# Patient Record
Sex: MALE | Race: WHITE | NOT HISPANIC OR LATINO | Employment: FULL TIME | URBAN - METROPOLITAN AREA
[De-identification: names, ages, dates, MRNs, and addresses within clinical notes are randomized per-mention and may not be internally consistent; named-entity substitution may affect disease eponyms.]

---

## 2017-04-03 ENCOUNTER — APPOINTMENT (OUTPATIENT)
Dept: LAB | Facility: CLINIC | Age: 62
End: 2017-04-03
Payer: COMMERCIAL

## 2017-04-03 ENCOUNTER — TRANSCRIBE ORDERS (OUTPATIENT)
Dept: LAB | Facility: CLINIC | Age: 62
End: 2017-04-03

## 2017-04-03 DIAGNOSIS — Z79.899 ENCOUNTER FOR LONG-TERM (CURRENT) USE OF OTHER MEDICATIONS: ICD-10-CM

## 2017-04-03 DIAGNOSIS — N40.0 BENIGN NON-NODULAR PROSTATIC HYPERPLASIA WITHOUT LOWER URINARY TRACT SYMPTOMS: Primary | ICD-10-CM

## 2017-04-03 DIAGNOSIS — E78.5 OTHER AND UNSPECIFIED HYPERLIPIDEMIA: ICD-10-CM

## 2017-04-03 DIAGNOSIS — E83.110 HEREDITARY HEMOCHROMATOSIS (HCC): ICD-10-CM

## 2017-04-03 LAB
ALBUMIN SERPL BCP-MCNC: 4 G/DL (ref 3.5–5)
ALP SERPL-CCNC: 48 U/L (ref 46–116)
ALT SERPL W P-5'-P-CCNC: 24 U/L (ref 12–78)
ANION GAP SERPL CALCULATED.3IONS-SCNC: 6 MMOL/L (ref 4–13)
AST SERPL W P-5'-P-CCNC: 14 U/L (ref 5–45)
BASOPHILS # BLD AUTO: 0.01 THOUSANDS/ΜL (ref 0–0.1)
BASOPHILS NFR BLD AUTO: 0 % (ref 0–1)
BILIRUB DIRECT SERPL-MCNC: 0.21 MG/DL (ref 0–0.2)
BILIRUB SERPL-MCNC: 0.79 MG/DL (ref 0.2–1)
BUN SERPL-MCNC: 23 MG/DL (ref 5–25)
CALCIUM SERPL-MCNC: 9 MG/DL (ref 8.3–10.1)
CHLORIDE SERPL-SCNC: 106 MMOL/L (ref 100–108)
CHOLEST SERPL-MCNC: 150 MG/DL (ref 50–200)
CK SERPL-CCNC: 105 U/L (ref 39–308)
CO2 SERPL-SCNC: 29 MMOL/L (ref 21–32)
CREAT SERPL-MCNC: 1.06 MG/DL (ref 0.6–1.3)
EOSINOPHIL # BLD AUTO: 0.07 THOUSAND/ΜL (ref 0–0.61)
EOSINOPHIL NFR BLD AUTO: 1 % (ref 0–6)
ERYTHROCYTE [DISTWIDTH] IN BLOOD BY AUTOMATED COUNT: 13.6 % (ref 11.6–15.1)
FERRITIN SERPL-MCNC: 88 NG/ML (ref 8–388)
GFR SERPL CREATININE-BSD FRML MDRD: >60 ML/MIN/1.73SQ M
GLUCOSE P FAST SERPL-MCNC: 101 MG/DL (ref 65–99)
HCT VFR BLD AUTO: 45.8 % (ref 36.5–49.3)
HDLC SERPL-MCNC: 56 MG/DL (ref 40–60)
HGB BLD-MCNC: 15.9 G/DL (ref 12–17)
LDLC SERPL CALC-MCNC: 79 MG/DL (ref 0–100)
LYMPHOCYTES # BLD AUTO: 1.36 THOUSANDS/ΜL (ref 0.6–4.47)
LYMPHOCYTES NFR BLD AUTO: 23 % (ref 14–44)
MCH RBC QN AUTO: 31.2 PG (ref 26.8–34.3)
MCHC RBC AUTO-ENTMCNC: 34.7 G/DL (ref 31.4–37.4)
MCV RBC AUTO: 90 FL (ref 82–98)
MONOCYTES # BLD AUTO: 0.4 THOUSAND/ΜL (ref 0.17–1.22)
MONOCYTES NFR BLD AUTO: 7 % (ref 4–12)
NEUTROPHILS # BLD AUTO: 4 THOUSANDS/ΜL (ref 1.85–7.62)
NEUTS SEG NFR BLD AUTO: 69 % (ref 43–75)
NRBC BLD AUTO-RTO: 0 /100 WBCS
PLATELET # BLD AUTO: 199 THOUSANDS/UL (ref 149–390)
PMV BLD AUTO: 10.7 FL (ref 8.9–12.7)
POTASSIUM SERPL-SCNC: 4.4 MMOL/L (ref 3.5–5.3)
PROT SERPL-MCNC: 7.2 G/DL (ref 6.4–8.2)
RBC # BLD AUTO: 5.09 MILLION/UL (ref 3.88–5.62)
SODIUM SERPL-SCNC: 141 MMOL/L (ref 136–145)
TRIGL SERPL-MCNC: 74 MG/DL
WBC # BLD AUTO: 5.86 THOUSAND/UL (ref 4.31–10.16)

## 2017-04-03 PROCEDURE — 85025 COMPLETE CBC W/AUTO DIFF WBC: CPT

## 2017-04-03 PROCEDURE — 82248 BILIRUBIN DIRECT: CPT

## 2017-04-03 PROCEDURE — 82107 ALPHA-FETOPROTEIN L3: CPT

## 2017-04-03 PROCEDURE — 84153 ASSAY OF PSA TOTAL: CPT

## 2017-04-03 PROCEDURE — 82728 ASSAY OF FERRITIN: CPT

## 2017-04-03 PROCEDURE — 82550 ASSAY OF CK (CPK): CPT

## 2017-04-03 PROCEDURE — 80061 LIPID PANEL: CPT

## 2017-04-03 PROCEDURE — 36415 COLL VENOUS BLD VENIPUNCTURE: CPT

## 2017-04-03 PROCEDURE — 80053 COMPREHEN METABOLIC PANEL: CPT

## 2017-04-03 PROCEDURE — 84154 ASSAY OF PSA FREE: CPT

## 2017-04-04 LAB
AFP L3 MFR SERPL: NORMAL % (ref 0–9.9)
AFP SERPL-MCNC: 1.1 NG/ML (ref 0–8)
PSA FREE MFR SERPL: 29.4 %
PSA FREE SERPL-MCNC: 0.5 NG/ML
PSA SERPL-MCNC: 1.7 NG/ML (ref 0–4)

## 2017-05-01 ENCOUNTER — ALLSCRIPTS OFFICE VISIT (OUTPATIENT)
Dept: OTHER | Facility: OTHER | Age: 62
End: 2017-05-01

## 2017-05-01 ENCOUNTER — HOSPITAL ENCOUNTER (OUTPATIENT)
Dept: ULTRASOUND IMAGING | Facility: HOSPITAL | Age: 62
Discharge: HOME | End: 2017-05-01
Attending: INTERNAL MEDICINE
Payer: COMMERCIAL

## 2017-05-01 DIAGNOSIS — E83.110 HEREDITARY HEMOCHROMATOSIS (HCC): ICD-10-CM

## 2017-05-01 PROCEDURE — 76700 US EXAM ABDOM COMPLETE: CPT

## 2017-05-04 ENCOUNTER — ALLSCRIPTS OFFICE VISIT (OUTPATIENT)
Dept: OTHER | Facility: OTHER | Age: 62
End: 2017-05-04

## 2017-05-05 ENCOUNTER — HOSPITAL ENCOUNTER (OUTPATIENT)
Dept: INFUSION CENTER | Facility: HOSPITAL | Age: 62
Discharge: HOME | End: 2017-05-05
Payer: COMMERCIAL

## 2017-05-05 VITALS
HEART RATE: 57 BPM | SYSTOLIC BLOOD PRESSURE: 146 MMHG | TEMPERATURE: 98.5 F | RESPIRATION RATE: 18 BRPM | DIASTOLIC BLOOD PRESSURE: 72 MMHG

## 2017-05-05 PROCEDURE — 99195 PHLEBOTOMY: CPT

## 2017-05-05 RX ADMIN — SODIUM CHLORIDE 250 ML: 0.9 INJECTION, SOLUTION INTRAVENOUS at 14:34

## 2017-05-05 NOTE — PLAN OF CARE
Problem: Potential for Falls  Goal: Patient will remain free of falls  INTERVENTIONS:  - Assess patient frequently for physical needs  - Identify cognitive and physical deficits and behaviors that affect risk of falls    - [place] fall precautions as indicated by assessment   - Educate patient/family on patient safety including physical limitations  - Instruct patient to call for assistance with activity based on assessment  - Modify environment to reduce risk of injury  - Consider OT/PT consult to assist with strengthening/mobility   Outcome: Progressing

## 2017-05-05 NOTE — PLAN OF CARE
Problem: Potential for Falls  Goal: Patient will remain free of falls  INTERVENTIONS:  - Assess patient frequently for physical needs  - Identify cognitive and physical deficits and behaviors that affect risk of falls    - [place] fall precautions as indicated by assessment   - Educate patient/family on patient safety including physical limitations  - Instruct patient to call for assistance with activity based on assessment  - Modify environment to reduce risk of injury  - Consider OT/PT consult to assist with strengthening/mobility   Outcome: Progressing

## 2017-05-05 NOTE — PROGRESS NOTES
Pt started phleb at 1505 and completed at 1552  Collected full 450ml phleb  Pt offered water pre and post and GAURI garcia for d/c when finished

## 2017-09-29 ENCOUNTER — GENERIC CONVERSION - ENCOUNTER (OUTPATIENT)
Dept: OTHER | Facility: OTHER | Age: 62
End: 2017-09-29

## 2017-09-29 ENCOUNTER — APPOINTMENT (OUTPATIENT)
Dept: LAB | Facility: CLINIC | Age: 62
End: 2017-09-29
Payer: COMMERCIAL

## 2017-09-29 DIAGNOSIS — R73.01 IMPAIRED FASTING GLUCOSE: ICD-10-CM

## 2017-09-29 DIAGNOSIS — Z23 ENCOUNTER FOR IMMUNIZATION: ICD-10-CM

## 2017-09-29 LAB
EST. AVERAGE GLUCOSE BLD GHB EST-MCNC: 108 MG/DL
GLUCOSE P FAST SERPL-MCNC: 100 MG/DL (ref 65–99)
HBA1C MFR BLD: 5.4 % (ref 4.2–6.3)

## 2017-09-29 PROCEDURE — 82947 ASSAY GLUCOSE BLOOD QUANT: CPT

## 2017-09-29 PROCEDURE — 83036 HEMOGLOBIN GLYCOSYLATED A1C: CPT

## 2017-09-29 PROCEDURE — 36415 COLL VENOUS BLD VENIPUNCTURE: CPT

## 2017-10-23 DIAGNOSIS — E83.110 HEREDITARY HEMOCHROMATOSIS (HCC): ICD-10-CM

## 2017-10-27 ENCOUNTER — APPOINTMENT (OUTPATIENT)
Dept: LAB | Facility: CLINIC | Age: 62
End: 2017-10-27
Payer: COMMERCIAL

## 2017-10-27 DIAGNOSIS — E83.110 HEREDITARY HEMOCHROMATOSIS (HCC): ICD-10-CM

## 2017-10-27 LAB
ALBUMIN SERPL BCP-MCNC: 3.5 G/DL (ref 3.5–5)
ALP SERPL-CCNC: 59 U/L (ref 46–116)
ALT SERPL W P-5'-P-CCNC: 17 U/L (ref 12–78)
ANION GAP SERPL CALCULATED.3IONS-SCNC: 7 MMOL/L (ref 4–13)
AST SERPL W P-5'-P-CCNC: 14 U/L (ref 5–45)
BASOPHILS # BLD AUTO: 0.03 THOUSANDS/ΜL (ref 0–0.1)
BASOPHILS NFR BLD AUTO: 0 % (ref 0–1)
BILIRUB SERPL-MCNC: 0.7 MG/DL (ref 0.2–1)
BUN SERPL-MCNC: 20 MG/DL (ref 5–25)
CALCIUM SERPL-MCNC: 8.8 MG/DL (ref 8.3–10.1)
CHLORIDE SERPL-SCNC: 109 MMOL/L (ref 100–108)
CO2 SERPL-SCNC: 26 MMOL/L (ref 21–32)
CREAT SERPL-MCNC: 1.03 MG/DL (ref 0.6–1.3)
EOSINOPHIL # BLD AUTO: 0.06 THOUSAND/ΜL (ref 0–0.61)
EOSINOPHIL NFR BLD AUTO: 1 % (ref 0–6)
ERYTHROCYTE [DISTWIDTH] IN BLOOD BY AUTOMATED COUNT: 13.9 % (ref 11.6–15.1)
FERRITIN SERPL-MCNC: 108 NG/ML (ref 8–388)
GFR SERPL CREATININE-BSD FRML MDRD: 76 ML/MIN/1.73SQ M
GLUCOSE P FAST SERPL-MCNC: 105 MG/DL (ref 65–99)
HCT VFR BLD AUTO: 42.7 % (ref 36.5–49.3)
HGB BLD-MCNC: 15.1 G/DL (ref 12–17)
LYMPHOCYTES # BLD AUTO: 1.43 THOUSANDS/ΜL (ref 0.6–4.47)
LYMPHOCYTES NFR BLD AUTO: 20 % (ref 14–44)
MCH RBC QN AUTO: 31.5 PG (ref 26.8–34.3)
MCHC RBC AUTO-ENTMCNC: 35.4 G/DL (ref 31.4–37.4)
MCV RBC AUTO: 89 FL (ref 82–98)
MONOCYTES # BLD AUTO: 0.6 THOUSAND/ΜL (ref 0.17–1.22)
MONOCYTES NFR BLD AUTO: 8 % (ref 4–12)
NEUTROPHILS # BLD AUTO: 4.96 THOUSANDS/ΜL (ref 1.85–7.62)
NEUTS SEG NFR BLD AUTO: 71 % (ref 43–75)
NRBC BLD AUTO-RTO: 0 /100 WBCS
PLATELET # BLD AUTO: 292 THOUSANDS/UL (ref 149–390)
PMV BLD AUTO: 10.5 FL (ref 8.9–12.7)
POTASSIUM SERPL-SCNC: 3.8 MMOL/L (ref 3.5–5.3)
PROT SERPL-MCNC: 7.2 G/DL (ref 6.4–8.2)
RBC # BLD AUTO: 4.79 MILLION/UL (ref 3.88–5.62)
SODIUM SERPL-SCNC: 142 MMOL/L (ref 136–145)
WBC # BLD AUTO: 7.11 THOUSAND/UL (ref 4.31–10.16)

## 2017-10-27 PROCEDURE — 82728 ASSAY OF FERRITIN: CPT

## 2017-10-27 PROCEDURE — 36415 COLL VENOUS BLD VENIPUNCTURE: CPT

## 2017-10-27 PROCEDURE — 85025 COMPLETE CBC W/AUTO DIFF WBC: CPT

## 2017-10-27 PROCEDURE — 80053 COMPREHEN METABOLIC PANEL: CPT

## 2017-11-06 ENCOUNTER — ALLSCRIPTS OFFICE VISIT (OUTPATIENT)
Dept: OTHER | Facility: OTHER | Age: 62
End: 2017-11-06

## 2017-11-06 ENCOUNTER — TRANSCRIBE ORDERS (OUTPATIENT)
Dept: ADMINISTRATIVE | Facility: HOSPITAL | Age: 62
End: 2017-11-06

## 2017-11-06 DIAGNOSIS — E83.110 PIGMENT CIRRHOSIS (HCC): Primary | ICD-10-CM

## 2017-11-07 ENCOUNTER — HOSPITAL ENCOUNTER (OUTPATIENT)
Dept: INFUSION CENTER | Facility: HOSPITAL | Age: 62
Discharge: HOME | End: 2017-11-07
Payer: COMMERCIAL

## 2017-11-07 VITALS
HEART RATE: 66 BPM | RESPIRATION RATE: 18 BRPM | DIASTOLIC BLOOD PRESSURE: 78 MMHG | TEMPERATURE: 98.2 F | SYSTOLIC BLOOD PRESSURE: 134 MMHG

## 2017-11-07 PROCEDURE — 99195 PHLEBOTOMY: CPT

## 2017-11-07 RX ORDER — AMIODARONE HYDROCHLORIDE 200 MG/1
200 TABLET ORAL DAILY
COMMUNITY

## 2017-11-07 RX ORDER — ATENOLOL 25 MG/1
25 TABLET ORAL DAILY
COMMUNITY
Start: 2016-10-23 | End: 2019-01-06 | Stop reason: SDUPTHER

## 2017-11-07 RX ORDER — ASPIRIN 81 MG/1
81 TABLET, CHEWABLE ORAL DAILY
COMMUNITY

## 2017-11-07 RX ADMIN — SODIUM CHLORIDE 250 ML: 0.9 INJECTION, SOLUTION INTRAVENOUS at 08:27

## 2017-11-07 NOTE — PROGRESS NOTES
Assessment  1  Primary hemochromatosis (275 01) (E83 110)    Plan  Primary hemochromatosis    · Drink plenty of fluids ; Status:Complete;   Done: [id number]   Ordered; For:Primary hemochromatosis; Ordered By:Proothi, Akshat;   · (1) ALPHA-FETOPROTEIN WITH AFP-L3%; Status:Active; Requested for:01May2018; Perform:[facility]; DQQ:[id number];[facility]; For:Primary hemochromatosis; Ordered By:Proothi, Akshat;   · (1) CBC/PLT/DIFF; Status:Active; Requested for:01May2018; Perform:[facility]; DLS:97UJL3416;QXLTLNX; For:Primary hemochromatosis; Ordered By:Proothi, Akshat;   · (1) COMPREHENSIVE METABOLIC PANEL; Status:Active; Requested for:01May2018; Perform:[facility]; BHW:36EQI4318;UMANTLW; For:Primary hemochromatosis; Ordered By:Proothi, Akshat;   · (1) FERRITIN; Status:Active; Requested for:01May2018; Perform:[facility]; UJH:46EBM9242;BSLELNE; For:Primary hemochromatosis; Ordered By:Agueda Dunlap Officer;   · * US ABDOMEN COMPLETE; Status:Hold For - Scheduling; Requested for:01May2018; Perform:[facility]; BAS:48KIE8392;HIVQCQA; For:Primary hemochromatosis; Ordered By:Procristobal, Akshat;   · Follow-up visit in 6 months Evaluation and Treatment  Follow-up  Status: Complete   Done: 99RHU0761 10:00AM   Ordered; For: Primary hemochromatosis; Ordered By: Dulce Maria Aleman Performed:  Due: 34QDC2500; Last Updated By: Lesly Hermosillo; 11/6/2017 8:42:43 AM    Discussion/Summary  Discussion Summary:   Patient is being treated for hemochromatosis and infrequently he gets phlebotomized to keep ferritin between 50 and 100  This time ferritin is 108  He will have phlebotomy tomorrow  Patient is feeling well  He exercises regularly going up and down the [place] in [place] [id number]  He watches his diet and avoids iron rich foods  No symptoms at presentexamination and test results are as recorded and discussed  Phlebotomy tomorrow   In 6 months he will have blood tests and ultrasound of abdomen specially liver and a follow-up visit  Condition and plan discussed in detail  Questions answered  He keeps himself hydrated  Counseling Documentation With Imm: The patient was counseled regarding diagnostic results,-- prognosis,-- patient and family education,-- impressions  total time of encounter was 20 minutes-- and-- 12 minutes was spent counseling  Goals and Barriers: The patient has the current Goals: To keep ferritin between 50 and 100, close to 50  The patent has the current Barriers: No barriers  Patient's Capacity to Self-Care: Patient is able to Self-Care  Medication SE Review and Pt Understands Tx: The treatment plan was reviewed with the patient/guardian  The patient/guardian understands and agrees with the treatment plan   Self Referrals:   Self Referrals: No   Understands and agrees with treatment plan: The treatment plan was reviewed with the patient/guardian  The patient/guardian understands and agrees with the treatment plan      Chief Complaint  Chief Complaint: Chief Complaint:   The patient presents to the office today with hemochromatosis  History of Present Illness  HPI: Patient is being treated for hemochromatosis and infrequently he gets phlebotomized to keep ferritin between 50 and 100  This time ferritin is 108  He will have phlebotomy tomorrow  Patient is feeling well  He exercises regularly going up and down the [place] in [place]  He watches his diet and avoids iron rich foods  No symptoms at present      Review of Systems       Reviewed 13 systems  Complete-Male:   Constitutional: No fever or chills, feels well, no tiredness, no recent weight gain or weight loss  Eyes: No complaints of eye pain, no red eyes, no discharge from eyes, no itchy eyes  ENT: no complaints of earache, no hearing loss, no nosebleeds, no nasal discharge, no sore throat, no hoarseness     Cardiovascular: No complaints of slow heart rate, no fast heart rate, no chest pain, no palpitations, no leg claudication, no lower extremity  Respiratory: No complaints of shortness of breath, no wheezing, no cough, no SOB on exertion, no orthopnea or PND  Gastrointestinal: No complaints of abdominal pain, no constipation, no nausea or vomiting, no diarrhea or bloody stools  Genitourinary: No complaints of dysuria, no incontinence, no hesitancy, no nocturia, no genital lesion, no testicular pain  Musculoskeletal: No complaints of arthralgia, no myalgias, no joint swelling or stiffness, no limb pain or swelling  Integumentary: No complaints of skin rash or skin lesions, no itching, no skin wound, no dry skin  Neurological: No compliants of headache, no confusion, no convulsions, no numbness or tingling, no dizziness or fainting, no limb weakness, no difficulty walking  Psychiatric: Is not suicidal, no sleep disturbances, no anxiety or depression, no change in personality, no emotional problems  Endocrine: No complaints of proptosis, no hot flashes, no muscle weakness, no erectile dysfunction, no deepening of the voice, no feelings of weakness  Hematologic/Lymphatic: No complaints of swollen glands, no swollen glands in the neck, does not bleed easily, no easy bruising  ROS Reviewed:   ROS reviewed  Active Problems  1  Benign essential hypertension (401 1) (I10)   2  Erectile dysfunction, unspecified erectile dysfunction type (607 84) (N52 9)   3  Hyperlipidemia (272 4) (E78 5)   4  Impaired fasting glucose (790 21) (R73 01)   5  Need for zoster vaccination (V04 89) (Z23)   6  Onychomycosis (110 1) (B35 1)   7  Paroxysmal atrial fibrillation (427 31) (I48 0)   8  Primary hemochromatosis (275 01) (E83 110)   9  Rosacea (695 3) (L71 9)   10  Sleep apnea (780 57) (G47 30)   11  Special screening examination for neoplasm of prostate (V76 44) (Z12 5)    Past Medical History  1  History of Chronic constipation (564 00) (K59 09)   2   History of hematuria (V13 09) (Z87 448)   3  History of non-neoplastic nevus (V13 3) (Z86 79)    Surgical History  1  History of Colonoscopy (Fiberoptic)  Surgical History Reviewed: The surgical history was reviewed and updated today  Reviewed and no change        Family History  Mother    1  Family history of Rheumatoid Arthritis  Family History Reviewed: The family history was reviewed and updated today  Social History   · Never A Smoker   · Never Drank Alcohol  Social History Reviewed: The social history was reviewed and is unchanged  Current Meds   1  Amiodarone HCl - 200 MG Oral Tablet; Therapy: (Recorded:11Xgg0796) to Recorded   2  Aspirin 81 MG Oral Tablet Chewable; USE AS DIRECTED; Therapy: (Recorded:25Elt3559) to Recorded   3  Atenolol 25 MG Oral Tablet; take 1 tablet every day as directed; Therapy: 27UFP0407 to (Evaluate:18Oct2017)  Requested for: 40JIS4223; Last   Rx:23Oct2016 Ordered   4  Cialis 5 MG Oral Tablet; TAKE AS DIRECTED; Therapy: 12SRN2010 to (Last Rx:78Wve1213)  Requested for: 60Pqt3219 Ordered   5  CoQ-10 200 MG CAPS; TAKE AS DIRECTED; Therapy: (0487 23 46 71) to Recorded   6  Erythromycin 2 % External Solution; APPLY DAILY FOR ROSACEA AS DIRECTED; Therapy: 30RRK0646 to (Evaluate:87Cdx8517)  Requested for: 67Uiz6639; Last   Rx:66Xcn2423 Ordered   7  Folic Acid 1 MG Oral Tablet; TAKE 1 TABLET DAILY AS DIRECTED; Therapy: (XKACBWWV:82DEC8404) to Recorded   8  Multi Complete CAPS; TAKE AS DIRECTED; Therapy: (0487 23 46 71) to Recorded   9  Omega 3 CAPS; TAKE AS DIRECTED; Therapy: (0487 23 46 71) to Recorded   10  Simvastatin 20 MG Oral Tablet; TAKE 1 TABLET DAILY AS DIRECTED; Therapy: 94FTZ9197 to (Ryan Roque)  Requested for: [id number]; Last    Rx:03Apr2015 Ordered    Allergies  1   No Known Drug Allergies                   Reviewed     Vitals  Vital Signs    Recorded: 28HOC9055 08:20AM   Temperature 96 8 F   Heart Rate 65   Respiration 15 Systolic 166   Diastolic 78   Height 5 ft 7 in   Weight 184 lb    BMI Calculated 28 82   BSA Calculated 1 95   O2 Saturation 97   Pain Scale 0                  Stable  [place] Physical Exam                                            ECOG Zero      Constitutional   General appearance: No acute distress, well appearing and well nourished  Head and Face   Head and face: Normal     Eyes   Conjunctiva and lids: No erythema, swelling or discharge  Pupils and irises: Equal, round, reactive to light  Ears, Nose, Mouth, and Throat   External inspection of ears and nose: Normal     Nasal mucosa, septum, and turbinates: Normal without edema or erythema  Oropharynx: Normal with no erythema, edema, exudate or lesions  Neck   Neck: Supple, symmetric, trachea midline, no masses  Thyroid: Normal, no thyromegaly  Pulmonary   Respiratory effort: No increased work of breathing or signs of respiratory distress  Percussion of chest: Normal     Auscultation of lungs: Clear to auscultation  Cardiovascular   Auscultation of heart: Normal rate and rhythm, normal S1 and S2, no murmurs  Peripheral vascular exam: Normal     Examination of extremities for edema and/or varicosities: Normal     Chest   Palpation of breasts and axillae: Normal, no masses palpated  Abdomen   Abdomen: Non-tender, no masses  Liver and spleen: No hepatomegaly or splenomegaly  Examination for hernias: No hernias appreciated  Lymphatic   Palpation of lymph nodes in neck: No lymphadenopathy  Palpation of lymph nodes in axillae: No lymphadenopathy  Palpation of lymph nodes in groin: No lymphadenopathy  Palpation of lymph nodes in other areas: No lymphadenopathy  Musculoskeletal   Gait and station: Normal     Inspection/palpation of digits and nails: Normal without clubbing or cyanosis      Inspection/palpation of joints, bones, and muscles: Normal     Range of motion: Normal     Stability: Normal     Muscle strength/tone: Normal     Skin   Skin and subcutaneous tissue: Normal without rashes or lesions  Neurologic   Cranial nerves: Cranial nerves 2-12 intact  Cortical function: Normal mental status  Coordination: Normal finger to nose and heel to shin  Psychiatric   Judgment and insight: Normal     Orientation to person, place and time: Normal     Recent and remote memory: Intact  Mood and affect: Normal        Results/Data  Results Form:   Results   Lab Results Hemoglobin 15 9  WBC 5860  Platelets 635,184  Normal differential count  Ferritin 88  (1) CBC/PLT/DIFF 27Oct2017 12:15PM Candee Deeds TW Order Number: [id number]     Test Name Result Flag Reference   WBC COUNT 7 11 Thousand/uL  4 31-10 16   RBC COUNT 4 79 Million/uL  3 88-5 62   HEMOGLOBIN 15 1 g/dL  12 0-17 0   HEMATOCRIT 42 7 %  36 5-49 3   MCV 89 fL  82-98   MCH 31 5 pg  26 8-34 3   MCHC 35 4 g/dL  31 4-37 4   RDW 13 9 %  11 6-15 1   MPV 10 5 fL  8 9-12 7   PLATELET COUNT 357 Thousands/uL  149-390   nRBC AUTOMATED 0 /100 WBCs     NEUTROPHILS RELATIVE PERCENT 71 %  43-75   LYMPHOCYTES RELATIVE PERCENT 20 %  14-44   MONOCYTES RELATIVE PERCENT 8 %  4-12   EOSINOPHILS RELATIVE PERCENT 1 %  0-6   BASOPHILS RELATIVE PERCENT 0 %  0-1   NEUTROPHILS ABSOLUTE COUNT 4 96 Thousands/? ??L  1 85-7 62   LYMPHOCYTES ABSOLUTE COUNT 1 43 Thousands/? ??L  0 60-4 47   MONOCYTES ABSOLUTE COUNT 0 60 Thousand/? ??L  0 17-1 22   EOSINOPHILS ABSOLUTE COUNT 0 06 Thousand/? ??L  0 00-0 61   BASOPHILS ABSOLUTE COUNT 0 03 Thousands/? ??L  0 00-0 10     (1) FERRITIN 27Oct2017 12:15PM Candee Deeds TW Order Number: [id number]     Test Name Result Flag Reference   FERRITIN 108 ng/mL  8-388     (1) COMPREHENSIVE METABOLIC PANEL 82DYY1506 28:55QC Candee Deeds TW Order Number: [id number]     Test Name Result Flag Reference   SODIUM 142 mmol/L  136-145   POTASSIUM 3 8 mmol/L  3 5-5 3   CHLORIDE 109 mmol/L H 100-108   CARBON DIOXIDE 26 mmol/L  21-32   ANION GAP (CALC) 7 mmol/L  4-13   BLOOD UREA NITROGEN 20 mg/dL  5-25   CREATININE 1 03 mg/dL  0 60-1 30   Standardized to IDMS reference method   CALCIUM 8 8 mg/dL  8 3-10 1   BILI, TOTAL 0 70 mg/dL  0 20-1 00   ALK PHOSPHATAS 59 U/L     ALT (SGPT) 17 U/L  12-78   Specimen collection should occur prior to Sulfasalazine and/or Sulfapyridine administration due to the potential for falsely depressed results  AST(SGOT) 14 U/L  5-45   Specimen collection should occur prior to Sulfasalazine administration due to the potential for falsely depressed results  ALBUMIN 3 5 g/dL  3 5-5 0   TOTAL PROTEIN 7 2 g/dL  6 4-8 2   eGFR 76 ml/min/1 73sq m     National Kidney Disease Education Program recommendations are as follows:  GFR calculation is accurate only with a steady state creatinine  Chronic Kidney disease less than 60 ml/min/1 73 sq  meters  Kidney failure less than 15 ml/min/1 73 sq  meters  GLUCOSE FASTING 105 mg/dL H 65-99   Specimen collection should occur prior to Sulfasalazine administration due to the potential for falsely depressed results  Specimen collection should occur prior to Sulfapyridine administration due to the potential for falsely elevated results  Future Appointments    Date/Time Provider Specialty Site   05/07/2018 08:00 AM ABIGAIL [place]   Family Medicine [facility]     Signatures   Electronically signed by : Eber Dockery MD; Nov 6 2017  8:45AM EST                       (Author)

## 2017-11-07 NOTE — PLAN OF CARE
Problem: Potential for Falls  Goal: Patient will remain free of falls  INTERVENTIONS:  - Assess patient frequently for physical needs  -  Identify cognitive and physical deficits and behaviors that affect risk of falls    -  [place] fall precautions as indicated by assessment   - Educate patient/family on patient safety including physical limitations  - Instruct patient to call for assistance with activity based on assessment  - Modify environment to reduce risk of injury  - Consider OT/PT consult to assist with strengthening/mobility   Outcome: Progressing

## 2017-11-07 NOTE — PROGRESS NOTES
50mL removed from left ac, 400mL removed from ac per protocol    Pt tolerated procedure well  Pressure dressing applied

## 2018-01-12 NOTE — PROGRESS NOTES
Assessment    1  Primary hemochromatosis (275 01) (E83 110)    Plan  Primary hemochromatosis    · (1) ALPHA-FETOPROTEIN WITH AFP-L3%; Status:Active; Requested for:96Yll2893;    Perform:[facility] [place]; MFX:65AJG1842;LUBIGCR; For:Primary hemochromatosis; Ordered By:Proothi, Akshat;   · (1) CBC/PLT/DIFF; Status:Active; Requested for:22Aug2016;    Perform:[facility]; MTR:66KCH7952;ANWYGNP; For:Primary hemochromatosis; Ordered By:Proothi, Akshat;   · (1) COMPREHENSIVE METABOLIC PANEL; Status:Active; Requested for:88Pir5637;    Perform:[facility]; TOP:65MVY3223;AFZQSPP; For:Primary hemochromatosis; Ordered By:Proothi, Akshat;   · (1) FERRITIN; Status:Active; Requested for:77Jwr6876;    Perform:[facility]; BIALEY:16YWK9094;UKBCBEY; For:Primary hemochromatosis; Ordered By:Proothi, Akshat;   · (1) IRON SATURATION %, TIBC; Status:Active; Requested for:04Ohn7320;    Perform:[facility]; JOSEPH:74ZCW2689;YUHURTQ; For:Primary hemochromatosis; Ordered By:Proothi, Akshat;   · Follow-up visit in 4 Months Evaluation and Treatment  Follow-up  Status: Hold For -  Scheduling  Requested for: 48SQX9866   Ordered; For: Primary hemochromatosis; Ordered By: Macrina Royal Performed:  Due: 06DEO3322   · Drink plenty of fluids ; Status:Complete;   Done: 85ADN0453   Ordered; For:Primary hemochromatosis; Ordered By:Proothi, Akshat;    Discussion/Summary  Discussion Summary:   Follow-up visit for hemochromatosis and patient gets phlebotomized periodically to keep ferritin between 50 and 100, more close to 50  This time ferritin is 61  He has been feeling well  He gets intravenous fluids when phlebotomized  Last alpha-fetoprotein was normal and recent ultrasound of liver did not show any lesion in the liver  No new symptoms  Examination and test results are as recorded and discussed  Ferritin 61, phlebotomy and no phlebotomy this time   Discussed and mutually agreed upon decision was no phlebotomy this time  Condition discussed and explained  Questions answered  Understands and agrees with treatment plan: The treatment plan was reviewed with the patient/guardian  The patient/guardian understands and agrees with the treatment plan   Counseling Documentation With Imm: The patient was counseled regarding diagnostic results, prognosis, patient and family education, impressions  total time of encounter was 20 minutes and 12 minutes was spent counseling  Chief Complaint  Chief Complaint: Chief Complaint:   The patient presents to the office today with hemachromatosis  History of Present Illness  HPI: Follow-up visit for hemochromatosis and patient gets phlebotomized periodically to keep ferritin between 50 and 100, more close to 50  This time ferritin is 61  He has been feeling well  He gets intravenous fluids when phlebotomized  Last alpha-fetoprotein was normal and recent ultrasound of liver did not show any lesion in the liver  No new symptoms  Review of Systems                                 No headaches, seizures, diplopia, dysphagia, hoarseness, angina pain, chest pain, palpitations, shortness of breath, cough, hemoptysis, abdominal pain, or melena  No fever, chills, bleeding, bone pains, skin rash, weight loss, nausea, vomiting and no change in bowel habits  Appetite is fair  No night sweats  Patient has minor arthritic symptoms  No leg cramps  No swollen ankles  No swollen glands  Not unusually sensitive to heat or cold  No recent or frequent infections  No anxiety or depression  Other symptoms as in history of present illness  Reviewed 13 systems  No new symptoms  ROS Reviewed:   ROS reviewed  Active Problems    1  Benign essential hypertension (401 1) (I10)   2  Chronic constipation (564 00) (K59 09)   3  Erectile dysfunction, unspecified erectile dysfunction type (607 84) (N52 9)   4  Hyperlipidemia (272 4) (E78 5)   5   Impaired fasting glucose (790 21) (R73 01)   6  Onychomycosis (110 1) (B35 1)   7  Paroxysmal atrial fibrillation (427 31) (I48 0)   8  Primary hemochromatosis (275 01) (E83 110)   9  Rosacea (695 3) (L71 9)   10  Sleep apnea (780 57) (G47 30)   11  Special screening examination for neoplasm of prostate (V76 44) (Z12 5)    Past Medical History    1  History of hematuria (V13 09) (Z87 448)   2  History of non-neoplastic nevus (V13 3) (Z86 79)    Surgical History    1  History of Colonoscopy (Fiberoptic)  Surgical History Reviewed: The surgical history was reviewed and updated today  Reviewed and no change        Family History  Mother    1  Family history of Rheumatoid Arthritis  Family History Reviewed: The family history was reviewed and updated today  Social History    · Never A Smoker   · Never Drank Alcohol  Social History Reviewed: The social history was reviewed and is unchanged  Current Meds   1  Amiodarone HCl - 200 MG Oral Tablet; Therapy: (Recorded:87Djs3856) to Recorded   2  Aspirin 81 MG Oral Tablet Chewable; USE AS DIRECTED; Therapy: (Recorded:71Jae7719) to Recorded   3  Atenolol 25 MG Oral Tablet; take 1 tablet every day as directed; Therapy: 95EWZ4672 to (Evaluate:07Nov6249)  Requested for: 74Mad7389; Last   Rx:22Nmt2771 Ordered   4  Cialis 5 MG Oral Tablet; TAKE AS DIRECTED; Therapy: 17XZY9750 to (Last HD:17WPJ7174) Ordered   5  CoQ-10 200 MG Oral Capsule; TAKE AS DIRECTED; Therapy: () to Recorded   6  Erythromycin 2 % External Solution; APPLY DAILY FOR ROSACEA AS DIRECTED; Therapy: 00DWA3658 to (Evaluate:52Oiy4959)  Requested for: 15Wgt3202; Last   Rx:47Rag4501 Ordered   7  Folic Acid 1 MG Oral Tablet; TAKE 1 TABLET DAILY AS DIRECTED; Therapy: (CTREXLMA:35RPS3813) to Recorded   8  Multi Complete CAPS; TAKE AS DIRECTED; Therapy: () to Recorded   9  Omega 3 CAPS; TAKE AS DIRECTED; Therapy: () to Recorded   10   Simvastatin 20 MG Oral Tablet; TAKE 1 TABLET DAILY AS DIRECTED; Therapy: 30AUM2985 to (Hettie Coil)  Requested for: [id number]; Last    Rx:03Apr2015 Ordered  Medication List Reviewed: The medication list was reviewed and updated today  Allergies    1  No Known Drug Allergies             Reviewed     Vitals  Vital Signs [Data Includes: Current Encounter]    Recorded: 52JZE9998 03:35PM   Temperature 97 9 F   Heart Rate 58   Respiration 14   Systolic 604   Diastolic 78   Height 5 ft 7 in   Weight 188 lb 6 08 oz   BMI Calculated 29 5   BSA Calculated 1 97   O2 Saturation 98   Pain Scale 0        Stable     Physical Exam                              Patient is alert and oriented and not in any acute distress  Stable vitals  No icterus  No oral thrush  No palpable neck mass  Lung fields are clear to percussion and auscultation  Heart rate is  regular  Abdomen soft and nontender  No palpable abdominal mass  No ascites  No edema of ankles  No calf tenderness  No focal neurological deficit  No skin rash  No palpable lymphadenopathy  Good distal arterial pulses  Emotionally stable  No clubbing  Performance status zero  No significant change in examination        ECOG Zero       Results/Data  Results   (1) FERRITIN 26Apr2016 08:58AM Proothi, Daralyn Ouch     Test Name Result Flag Reference   FERRITIN 61 ng/mL  8-388     (1) CBC/PLT/DIFF 26Apr2016 08:58AM Proothi, Akshat     Test Name Result Flag Reference   WBC COUNT 7 29 Thousand/uL  4 31-10 16   RBC COUNT 4 72 Million/uL  3 88-5 62   HEMOGLOBIN 14 2 g/dL  12 0-17 0   HEMATOCRIT 42 5 %  36 5-49 3   MCV 90 fL  82-98   MCH 30 1 pg  26 8-34 3   MCHC 33 4 g/dL  31 4-37 4   RDW 14 3 %  11 6-15 1   MPV 10 7 fL  8 9-12 7   PLATELET COUNT 949 Thousands/uL  149-390   nRBC AUTOMATED 0 /100 WBCs     NEUTROPHILS RELATIVE PERCENT 67 %  43-75   LYMPHOCYTES RELATIVE PERCENT 24 %  14-44   MONOCYTES RELATIVE PERCENT 7 %  4-12   EOSINOPHILS RELATIVE PERCENT 1 %  0-6   BASOPHILS RELATIVE PERCENT 1 %  0-1   NEUTROPHILS ABSOLUTE COUNT 4 80 Thousands/µL  1 85-7 62   LYMPHOCYTES ABSOLUTE COUNT 1 76 Thousands/µL  0 60-4 47   MONOCYTES ABSOLUTE COUNT 0 54 Thousand/µL  0 17-1 22   EOSINOPHILS ABSOLUTE COUNT 0 08 Thousand/µL  0 00-0 61   BASOPHILS ABSOLUTE COUNT 0 04 Thousands/µL  0 00-0 10     (1) PSA, DIAGNOSTIC (FOLLOW-UP) 87Vub9076 08:58AM EPIC, Provider   Test ordered by: Ashlie Sánchez     Test Name Result Flag Reference   PSA 1 9 ng/mL  0 0-4 0     * Ultrasound Abdomen Complete 15Sep2015 06:27AM Proothi, Akshat     Test Name Result Flag Reference   U/S Abdom Complete (Report)     [address] [facility] [address];;[place];[address]   09/15/2015 9008   09/15/2015 0719   NONE     ABDOMEN ULTRASOUND, COMPLETE     INDICATION- Patient has a history of hemachromatosis  Follow-up exam      COMPARISON- CT scan 10/13/2014     TECHNIQUE-  Real-time ultrasound of the abdomen was performed with a   curvilinear transducer with both volumetric sweeps and still imaging   techniques  FINDINGS-     PANCREAS- Visualized portions of the pancreas are within normal limits  AORTA AND IVC- Visualized portions are normal for patient age  LIVER-   Size- Within normal range  The liver measures 16 7 cm in the   midclavicular line  Contour- Surface contour is smooth  Parenchyma- Echogenicity and echotexture are within normal limits  No evidence of suspicious mass  The main portal vein is patent and hepatopetal      BILIARY-   The gallbladder is normal in caliber  No wall thickening or pericholecystic fluid  No stones or sludge identified  Sonographic Virl Carleen sign is negative  No intrahepatic biliary dilatation  CBD measures 3 mm  No choledocholithiasis  KIDNEY-    Right kidney measures 11 8 cm  Within normal limits  Left kidney measures 11 4 cm  Within normal limits  SPLEEN-    Measures 10 8 cm  Within normal limits  ASCITES- None       IMPRESSION- Normal                Transcribed on- [id number]     MIRTA Rocha MD   Reading Radiologist- MIRTA Isidro MD   Electronically Signed- MIRTA Isidro MD   Released Date Time- 09/15/15 0845   ------------------------------------------------------------------------------   9611^JUANCHO SAUNDERS   9611^JUANCHO SAUNDERS     (1) IRON 08Pbq4678 09:52AM Akshat Dunlap     Test Name Result Flag Reference   IRON 76 mcg/dL     Iron values may be falsely elevated in serum samples from patients  treated with anticoagulants (e g , hemodialysis patients)  Future Appointments    Date/Time Provider Specialty Site   05/01/2017 08:00 AM ABIGAIL Soler   09/09/2016 08:15 AM Marcell Dunlap MD Hematology Oncology [facility]   09/09/2016 01:15 PM Maritza Jackman MD Hematology Oncology [facility]     Signatures   Electronically signed by : Nani Han MD; May  4 2016  7:07AM EST                       (Author)

## 2018-01-13 VITALS
TEMPERATURE: 96.8 F | SYSTOLIC BLOOD PRESSURE: 124 MMHG | BODY MASS INDEX: 28.88 KG/M2 | DIASTOLIC BLOOD PRESSURE: 78 MMHG | OXYGEN SATURATION: 97 % | HEART RATE: 65 BPM | HEIGHT: 67 IN | RESPIRATION RATE: 15 BRPM | WEIGHT: 184 LBS

## 2018-01-13 VITALS
WEIGHT: 182.4 LBS | SYSTOLIC BLOOD PRESSURE: 120 MMHG | DIASTOLIC BLOOD PRESSURE: 78 MMHG | BODY MASS INDEX: 28.63 KG/M2 | HEART RATE: 60 BPM | RESPIRATION RATE: 16 BRPM | HEIGHT: 67 IN | TEMPERATURE: 97 F

## 2018-01-13 VITALS
WEIGHT: 184.25 LBS | TEMPERATURE: 97.2 F | BODY MASS INDEX: 28.92 KG/M2 | HEART RATE: 52 BPM | OXYGEN SATURATION: 97 % | SYSTOLIC BLOOD PRESSURE: 126 MMHG | DIASTOLIC BLOOD PRESSURE: 72 MMHG | RESPIRATION RATE: 16 BRPM | HEIGHT: 67 IN

## 2018-01-15 NOTE — RESULT NOTES
Message   Please let the patient know I reviewed his blood work results (that Dr Reyes Quach ordered)  His total cholesterol was normal at 162 (should be <200)  LDL was normal at 97 (should be <100)  HDL was okay at 51 (should be at least >40 but ideally >60)  Triglycerides were normal at 69 (should be <150)  Electrolytes, kidney function and liver function were all WNL  Fasting glucose was normal at 99 (should be <100)  A1C (which also monitors for diabetes) was normal at 5 1% (should be <5 7%)  Thank you  Verified Results  (1) LIPID PANEL, FASTING 14Sep2016 11:46AM Tarunmell Summers Order Number: [id number]     Test Name Result Flag Reference   CHOLESTEROL 162 mg/dL     HDL,DIRECT 51 mg/dL  40-60   Specimen collection should occur prior to Metamizole administration due to the potential for falsely depressed results  LDL CHOLESTEROL CALCULATED 97 mg/dL  0-100   Triglyceride:         Normal              <150 mg/dl       Borderline High    150-199 mg/dl       High               200-499 mg/dl       Very High          >499 mg/dl  Cholesterol:         Desirable        <200 mg/dl      Borderline High  200-239 mg/dl      High             >239 mg/dl  HDL Cholesterol:        High    >59 mg/dL      Low     <41 mg/dL  LDL CALCULATED:    This screening LDL is a calculated result  It does not have the accuracy of the Direct Measured LDL in the monitoring of patients with hyperlipidemia and/or statin therapy  Direct Measure LDL (ITC925) must be ordered separately in these patients  TRIGLYCERIDES 69 mg/dL  <=150   Specimen collection should occur prior to N-Acetylcysteine or Metamizole administration due to the potential for falsely depressed results

## 2018-01-15 NOTE — RESULT NOTES
Verified Results  (1) COMPREHENSIVE METABOLIC PANEL 29EIE8592 61:81WV Anny Decker Order Number: [id number]     Test Name Result Flag Reference   GLUCOSE,RANDM 99 mg/dL     If the patient is fasting, the ADA then defines impaired fasting glucose as > 100 mg/dL and diabetes as > or equal to 123 mg/dL  SODIUM 141 mmol/L  136-145   POTASSIUM 4 5 mmol/L  3 5-5 3   CHLORIDE 107 mmol/L  100-108   CARBON DIOXIDE 28 mmol/L  21-32   ANION GAP (CALC) 6 mmol/L  4-13   BLOOD UREA NITROGEN 16 mg/dL  5-25   CREATININE 1 16 mg/dL  0 60-1 30   Standardized to IDMS reference method   CALCIUM 8 7 mg/dL  8 3-10 1   BILI, TOTAL 0 61 mg/dL  0 20-1 00   ALK PHOSPHATAS 50 U/L     ALT (SGPT) 28 U/L  12-78   AST(SGOT) 23 U/L  5-45   ALBUMIN 4 1 g/dL  3 5-5 0   TOTAL PROTEIN 7 2 g/dL  6 4-8 2   eGFR Non-African American      >60 0 ml/min/1 73sq [facility] Disease Education Program recommendations are as follows:  GFR calculation is accurate only with a steady state creatinine  Chronic Kidney disease less than 60 ml/min/1 73 sq  meters  Kidney failure less than 15 ml/min/1 73 sq  meters  (1) LIPID PANEL, FASTING 82Siw7419 11:46AM Anny Decker Order Number: [id number]     Test Name Result Flag Reference   CHOLESTEROL 162 mg/dL     HDL,DIRECT 51 mg/dL  40-60   Specimen collection should occur prior to Metamizole administration due to the potential for falsely depressed results  LDL CHOLESTEROL CALCULATED 97 mg/dL  0-100   Triglyceride:         Normal              <150 mg/dl       Borderline High    150-199 mg/dl       High               200-499 mg/dl       Very High          >499 mg/dl  Cholesterol:         Desirable        <200 mg/dl      Borderline High  200-239 mg/dl      High             >239 mg/dl  HDL Cholesterol:        High    >59 mg/dL      Low     <41 mg/dL  LDL CALCULATED:    This screening LDL is a calculated result    It does not have the accuracy of the Direct Measured LDL in the monitoring of patients with hyperlipidemia and/or statin therapy  Direct Measure LDL (LOV493) must be ordered separately in these patients  TRIGLYCERIDES 69 mg/dL  <=150   Specimen collection should occur prior to N-Acetylcysteine or Metamizole administration due to the potential for falsely depressed results  (1) HEMOGLOBIN A1C 43Fkk7244 11:46AM Deep Cordoba Order Number: [id number]     Test Name Result Flag Reference   HEMOGLOBIN A1C 5 1 %  4 2-6 3   EST  AVG   GLUCOSE 100 mg/dl

## 2018-01-15 NOTE — MISCELLANEOUS
Message   Recorded as Task   Date: 09/08/2016 12:41 PM, Created By: Salvador Capone   Task Name: Care Coordination   Assigned To: Tegan Peña   Regarding Patient: Zarina Gilmore, Status: Active   Comment:    Karissa Castillo - 08 Sep 2016 12:41 PM     TASK CREATED  Caller: Self; Care Coordination; [phone] (Home)  DARA / DR RIVERA PT, CALLED TO CANCEL HIS [facility] APPT AND PHLEBOTOMY FOR FRIDAY 9/9/16  HE IS IN [place]  HE WILL CALL BACK TO R/S BOTH  Noted  Active Problems    1  Benign essential hypertension (401 1) (I10)   2  Chronic constipation (564 00) (K59 09)   3  Erectile dysfunction, unspecified erectile dysfunction type (607 84) (N52 9)   4  Hyperlipidemia (272 4) (E78 5)   5  Impaired fasting glucose (790 21) (R73 01)   6  Onychomycosis (110 1) (B35 1)   7  Paroxysmal atrial fibrillation (427 31) (I48 0)   8  Primary hemochromatosis (275 01) (E83 110)   9  Rosacea (695 3) (L71 9)   10  Sleep apnea (780 57) (G47 30)   11  Special screening examination for neoplasm of prostate (V76 44) (Z12 5)    Current Meds   1  Amiodarone HCl - 200 MG Oral Tablet; Therapy: (Recorded:62Iqt9178) to Recorded   2  Aspirin 81 MG Oral Tablet Chewable; USE AS DIRECTED; Therapy: (Recorded:61Swv2875) to Recorded   3  Atenolol 25 MG Oral Tablet; take 1 tablet every day as directed; Therapy: 79JJT1137 to (Evaluate:08Aih3099)  Requested for: 23Xse2206; Last   Rx:99Yix6573 Ordered   4  Cialis 5 MG Oral Tablet; TAKE AS DIRECTED; Therapy: 15LBO9844 to (Last DT:33BSU8976) Ordered   5  CoQ-10 200 MG Oral Capsule; TAKE AS DIRECTED; Therapy: ([phone]) to Recorded   6  Erythromycin 2 % External Solution; APPLY DAILY FOR ROSACEA AS DIRECTED; Therapy: 71AMK7325 to (Evaluate:45Pgf1337)  Requested for: 30Muz1706; Last   Rx:36Uaz6638 Ordered   7  Folic Acid 1 MG Oral Tablet; TAKE 1 TABLET DAILY AS DIRECTED; Therapy: (ZNAXNLAQ:33JEC9636) to Recorded   8  Multi Complete CAPS; TAKE AS DIRECTED;    Therapy: ([phone]) to Recorded   9  Omega 3 CAPS; TAKE AS DIRECTED; Therapy: ([phone]) to Recorded   10  Simvastatin 20 MG Oral Tablet; TAKE 1 TABLET DAILY AS DIRECTED; Therapy: 58AFD6414 to (Katlin Bustos)  Requested for: [id number]; Last    Rx:03Apr2015 Ordered    Allergies    1   No Known Drug Allergies    Signatures   Electronically signed by : Charisse Phalen, RN; Sep  8 2016 12:45PM EST                       (Author)

## 2018-01-15 NOTE — RESULT NOTES
Verified Results  (1) GLUCOSE,  FASTING 29Sep2017 09:23AM David Sue Order Number: [id number]     Test Name Result Flag Reference   GLUCOSE FASTING 100 mg/dL H 65-99   Specimen collection should occur prior to Sulfasalazine administration due to the potential for falsely depressed results  Specimen collection should occur prior to Sulfapyridine administration due to the potential for falsely elevated results  (1) HEMOGLOBIN A1C 29Sep2017 09:23AM David Sue Order Number: [id number]     Test Name Result Flag Reference   HEMOGLOBIN A1C 5 4 %  4 2-6 3   EST  AVG   GLUCOSE 108 mg/dl

## 2018-02-13 ENCOUNTER — TELEPHONE (OUTPATIENT)
Dept: HEMATOLOGY ONCOLOGY | Facility: CLINIC | Age: 63
End: 2018-02-13

## 2018-02-21 ENCOUNTER — TELEPHONE (OUTPATIENT)
Dept: HEMATOLOGY ONCOLOGY | Facility: CLINIC | Age: 63
End: 2018-02-21

## 2018-02-21 NOTE — TELEPHONE ENCOUNTER
Case Number [id number]  Spoke to you previously regarding a patient's medical records and would like are call back

## 2018-02-27 ENCOUNTER — TRANSCRIBE ORDERS (OUTPATIENT)
Dept: ADMINISTRATIVE | Facility: HOSPITAL | Age: 63
End: 2018-02-27

## 2018-02-27 DIAGNOSIS — R06.02 SOB (SHORTNESS OF BREATH): Primary | ICD-10-CM

## 2018-04-23 ENCOUNTER — TELEPHONE (OUTPATIENT)
Dept: FAMILY MEDICINE CLINIC | Facility: CLINIC | Age: 63
End: 2018-04-23

## 2018-04-23 DIAGNOSIS — R73.01 IMPAIRED FASTING GLUCOSE: Primary | ICD-10-CM

## 2018-04-23 DIAGNOSIS — E78.5 HYPERLIPIDEMIA, UNSPECIFIED HYPERLIPIDEMIA TYPE: ICD-10-CM

## 2018-04-23 DIAGNOSIS — I10 BENIGN ESSENTIAL HYPERTENSION: ICD-10-CM

## 2018-04-28 RX ORDER — SIMVASTATIN 20 MG
1 TABLET ORAL DAILY
COMMUNITY
Start: 2011-10-13 | End: 2018-05-03 | Stop reason: HOSPADM

## 2018-04-28 RX ORDER — CIPROFLOXACIN HYDROCHLORIDE 3.5 MG/ML
1 SOLUTION/ DROPS TOPICAL
COMMUNITY
Start: 2017-10-12

## 2018-04-28 RX ORDER — ERYTHROMYCIN 20 MG/G
GEL TOPICAL
COMMUNITY
Start: 2011-01-31 | End: 2018-08-28 | Stop reason: SDUPTHER

## 2018-04-28 RX ORDER — FOLIC ACID 1 MG/1
1 TABLET ORAL DAILY
COMMUNITY

## 2018-04-28 RX ORDER — TADALAFIL 5 MG/1
TABLET ORAL
COMMUNITY
Start: 2016-05-02 | End: 2018-06-07 | Stop reason: SDUPTHER

## 2018-04-28 RX ORDER — CHLORAL HYDRATE 500 MG
CAPSULE ORAL
COMMUNITY

## 2018-04-28 RX ORDER — ATORVASTATIN CALCIUM 20 MG/1
20 TABLET, FILM COATED ORAL DAILY
Refills: 3 | COMMUNITY
Start: 2018-01-23

## 2018-05-01 DIAGNOSIS — E83.110 HEREDITARY HEMOCHROMATOSIS (HCC): ICD-10-CM

## 2018-05-02 ENCOUNTER — HOSPITAL ENCOUNTER (OUTPATIENT)
Dept: PULMONOLOGY | Facility: HOSPITAL | Age: 63
Discharge: HOME | End: 2018-05-02
Attending: INTERNAL MEDICINE
Payer: MEDICARE

## 2018-05-02 ENCOUNTER — TRANSCRIBE ORDERS (OUTPATIENT)
Dept: ADMINISTRATIVE | Facility: HOSPITAL | Age: 63
End: 2018-05-02

## 2018-05-02 ENCOUNTER — APPOINTMENT (OUTPATIENT)
Dept: LAB | Facility: HOSPITAL | Age: 63
End: 2018-05-02
Attending: INTERNAL MEDICINE
Payer: MEDICARE

## 2018-05-02 DIAGNOSIS — E78.5 HYPERLIPIDEMIA, UNSPECIFIED HYPERLIPIDEMIA TYPE: ICD-10-CM

## 2018-05-02 DIAGNOSIS — I10 BENIGN ESSENTIAL HYPERTENSION: ICD-10-CM

## 2018-05-02 DIAGNOSIS — R73.01 IMPAIRED FASTING GLUCOSE: ICD-10-CM

## 2018-05-02 DIAGNOSIS — E83.110 HEREDITARY HEMOCHROMATOSIS (HCC): ICD-10-CM

## 2018-05-02 DIAGNOSIS — N40.0 BENIGN PROSTATIC HYPERPLASIA, UNSPECIFIED WHETHER LOWER URINARY TRACT SYMPTOMS PRESENT: ICD-10-CM

## 2018-05-02 DIAGNOSIS — L71.9 ROSACEA: Primary | ICD-10-CM

## 2018-05-02 DIAGNOSIS — N40.0 BENIGN PROSTATIC HYPERPLASIA, UNSPECIFIED WHETHER LOWER URINARY TRACT SYMPTOMS PRESENT: Primary | ICD-10-CM

## 2018-05-02 DIAGNOSIS — R06.02 SOB (SHORTNESS OF BREATH): ICD-10-CM

## 2018-05-02 LAB
ALBUMIN SERPL BCP-MCNC: 3.9 G/DL (ref 3.5–5)
ALP SERPL-CCNC: 51 U/L (ref 46–116)
ALT SERPL W P-5'-P-CCNC: 18 U/L (ref 12–78)
ANION GAP SERPL CALCULATED.3IONS-SCNC: 11 MMOL/L (ref 4–13)
AST SERPL W P-5'-P-CCNC: 13 U/L (ref 5–45)
BASOPHILS # BLD AUTO: 0.02 THOUSANDS/ΜL (ref 0–0.1)
BASOPHILS NFR BLD AUTO: 0 % (ref 0–1)
BILIRUB SERPL-MCNC: 0.6 MG/DL (ref 0.2–1)
BUN SERPL-MCNC: 22 MG/DL (ref 5–25)
CALCIUM SERPL-MCNC: 8.6 MG/DL (ref 8.3–10.1)
CHLORIDE SERPL-SCNC: 105 MMOL/L (ref 100–108)
CHOLEST SERPL-MCNC: 135 MG/DL (ref 50–200)
CO2 SERPL-SCNC: 25 MMOL/L (ref 21–32)
CREAT SERPL-MCNC: 0.97 MG/DL (ref 0.6–1.3)
EOSINOPHIL # BLD AUTO: 0.06 THOUSAND/ΜL (ref 0–0.61)
EOSINOPHIL NFR BLD AUTO: 1 % (ref 0–6)
ERYTHROCYTE [DISTWIDTH] IN BLOOD BY AUTOMATED COUNT: 15.2 % (ref 11.6–15.1)
EST. AVERAGE GLUCOSE BLD GHB EST-MCNC: 100 MG/DL
FERRITIN SERPL-MCNC: 33 NG/ML (ref 8–388)
GFR SERPL CREATININE-BSD FRML MDRD: 82 ML/MIN/1.73SQ M
GLUCOSE P FAST SERPL-MCNC: 92 MG/DL (ref 65–99)
HBA1C MFR BLD: 5.1 % (ref 4.2–6.3)
HCT VFR BLD AUTO: 44.9 % (ref 36.5–49.3)
HDLC SERPL-MCNC: 50 MG/DL (ref 40–60)
HGB BLD-MCNC: 15.3 G/DL (ref 12–17)
LDLC SERPL CALC-MCNC: 76 MG/DL (ref 0–100)
LYMPHOCYTES # BLD AUTO: 1.38 THOUSANDS/ΜL (ref 0.6–4.47)
LYMPHOCYTES NFR BLD AUTO: 19 % (ref 14–44)
MCH RBC QN AUTO: 30.5 PG (ref 26.8–34.3)
MCHC RBC AUTO-ENTMCNC: 34.1 G/DL (ref 31.4–37.4)
MCV RBC AUTO: 90 FL (ref 82–98)
MONOCYTES # BLD AUTO: 0.58 THOUSAND/ΜL (ref 0.17–1.22)
MONOCYTES NFR BLD AUTO: 8 % (ref 4–12)
NEUTROPHILS # BLD AUTO: 5.27 THOUSANDS/ΜL (ref 1.85–7.62)
NEUTS SEG NFR BLD AUTO: 72 % (ref 43–75)
NONHDLC SERPL-MCNC: 85 MG/DL
PLATELET # BLD AUTO: 228 THOUSANDS/UL (ref 149–390)
PMV BLD AUTO: 10.2 FL (ref 8.9–12.7)
POTASSIUM SERPL-SCNC: 3.8 MMOL/L (ref 3.5–5.3)
PROT SERPL-MCNC: 7.3 G/DL (ref 6.4–8.2)
PSA SERPL-MCNC: 2 NG/ML (ref 0–4)
RBC # BLD AUTO: 5.01 MILLION/UL (ref 3.88–5.62)
SODIUM SERPL-SCNC: 141 MMOL/L (ref 136–145)
T4 FREE SERPL-MCNC: 0.93 NG/DL (ref 0.76–1.46)
TRIGL SERPL-MCNC: 46 MG/DL
TSH SERPL DL<=0.05 MIU/L-ACNC: 12.65 UIU/ML (ref 0.36–3.74)
WBC # BLD AUTO: 7.31 THOUSAND/UL (ref 4.31–10.16)

## 2018-05-02 PROCEDURE — 82728 ASSAY OF FERRITIN: CPT

## 2018-05-02 PROCEDURE — 85025 COMPLETE CBC W/AUTO DIFF WBC: CPT

## 2018-05-02 PROCEDURE — 94726 PLETHYSMOGRAPHY LUNG VOLUMES: CPT

## 2018-05-02 PROCEDURE — 82107 ALPHA-FETOPROTEIN L3: CPT

## 2018-05-02 PROCEDURE — 84443 ASSAY THYROID STIM HORMONE: CPT

## 2018-05-02 PROCEDURE — 94060 EVALUATION OF WHEEZING: CPT | Performed by: INTERNAL MEDICINE

## 2018-05-02 PROCEDURE — 94760 N-INVAS EAR/PLS OXIMETRY 1: CPT

## 2018-05-02 PROCEDURE — 94726 PLETHYSMOGRAPHY LUNG VOLUMES: CPT | Performed by: INTERNAL MEDICINE

## 2018-05-02 PROCEDURE — 80061 LIPID PANEL: CPT

## 2018-05-02 PROCEDURE — G0103 PSA SCREENING: HCPCS

## 2018-05-02 PROCEDURE — 94729 DIFFUSING CAPACITY: CPT | Performed by: INTERNAL MEDICINE

## 2018-05-02 PROCEDURE — 94060 EVALUATION OF WHEEZING: CPT

## 2018-05-02 PROCEDURE — 36415 COLL VENOUS BLD VENIPUNCTURE: CPT

## 2018-05-02 PROCEDURE — 83036 HEMOGLOBIN GLYCOSYLATED A1C: CPT

## 2018-05-02 PROCEDURE — 94729 DIFFUSING CAPACITY: CPT

## 2018-05-02 PROCEDURE — 84439 ASSAY OF FREE THYROXINE: CPT

## 2018-05-02 PROCEDURE — 80053 COMPREHEN METABOLIC PANEL: CPT

## 2018-05-02 RX ORDER — ALBUTEROL SULFATE 2.5 MG/3ML
2.5 SOLUTION RESPIRATORY (INHALATION) EVERY 6 HOURS PRN
Status: DISCONTINUED | OUTPATIENT
Start: 2018-05-02 | End: 2018-05-06 | Stop reason: HOSPADM

## 2018-05-02 RX ORDER — ERYTHROMYCIN 20 MG/ML
SOLUTION TOPICAL
Qty: 60 ML | Refills: 5 | Status: SHIPPED | OUTPATIENT
Start: 2018-05-02 | End: 2018-05-03 | Stop reason: HOSPADM

## 2018-05-02 RX ADMIN — ALBUTEROL SULFATE 2.5 MG: 2.5 SOLUTION RESPIRATORY (INHALATION) at 08:44

## 2018-05-03 ENCOUNTER — HOSPITAL ENCOUNTER (OUTPATIENT)
Dept: ULTRASOUND IMAGING | Facility: HOSPITAL | Age: 63
Discharge: HOME | End: 2018-05-03
Attending: INTERNAL MEDICINE
Payer: MEDICARE

## 2018-05-03 ENCOUNTER — OFFICE VISIT (OUTPATIENT)
Dept: FAMILY MEDICINE CLINIC | Facility: CLINIC | Age: 63
End: 2018-05-03
Payer: MEDICARE

## 2018-05-03 VITALS
HEIGHT: 66 IN | DIASTOLIC BLOOD PRESSURE: 74 MMHG | WEIGHT: 172 LBS | HEART RATE: 70 BPM | SYSTOLIC BLOOD PRESSURE: 120 MMHG | OXYGEN SATURATION: 96 % | BODY MASS INDEX: 27.64 KG/M2 | TEMPERATURE: 97.6 F | RESPIRATION RATE: 16 BRPM

## 2018-05-03 DIAGNOSIS — I10 BENIGN ESSENTIAL HYPERTENSION: ICD-10-CM

## 2018-05-03 DIAGNOSIS — N40.1 BENIGN PROSTATIC HYPERPLASIA WITH LOWER URINARY TRACT SYMPTOMS, SYMPTOM DETAILS UNSPECIFIED: ICD-10-CM

## 2018-05-03 DIAGNOSIS — R79.89 ELEVATED TSH: ICD-10-CM

## 2018-05-03 DIAGNOSIS — E78.2 MIXED HYPERLIPIDEMIA: ICD-10-CM

## 2018-05-03 DIAGNOSIS — R73.01 IMPAIRED FASTING GLUCOSE: Primary | ICD-10-CM

## 2018-05-03 DIAGNOSIS — G47.33 OBSTRUCTIVE SLEEP APNEA SYNDROME: ICD-10-CM

## 2018-05-03 DIAGNOSIS — L71.9 ROSACEA: ICD-10-CM

## 2018-05-03 DIAGNOSIS — H61.21 IMPACTED CERUMEN OF RIGHT EAR: ICD-10-CM

## 2018-05-03 DIAGNOSIS — N39.41 URGE INCONTINENCE OF URINE: ICD-10-CM

## 2018-05-03 DIAGNOSIS — N52.9 ERECTILE DYSFUNCTION, UNSPECIFIED ERECTILE DYSFUNCTION TYPE: ICD-10-CM

## 2018-05-03 DIAGNOSIS — E83.110 PRIMARY HEMOCHROMATOSIS (HCC): ICD-10-CM

## 2018-05-03 DIAGNOSIS — E83.110 HEREDITARY HEMOCHROMATOSIS (HCC): ICD-10-CM

## 2018-05-03 PROCEDURE — 76700 US EXAM ABDOM COMPLETE: CPT

## 2018-05-03 PROCEDURE — 99215 OFFICE O/P EST HI 40 MIN: CPT | Performed by: FAMILY MEDICINE

## 2018-05-03 RX ORDER — LANOLIN ALCOHOL/MO/W.PET/CERES
CREAM (GRAM) TOPICAL
COMMUNITY

## 2018-05-03 NOTE — PROGRESS NOTES
Assessment/Plan: We reviewed his recent labs and specifics are noted below under the various diagnoses  All others were non-remarkable  Immunizations are up-to-date with the exception that he is now 70 and should get his pneumococcal vaccines  He would like to begin this in the fall and will get a Prevnar 13 initially and then a Pneumovax in 1 year following that  He also should consider the new shingles vaccine as a booster and will do so when it is available and probably at next visit  In regard to his chronic cerumen issues he should use cerumen softening drops on a regular basis each month to try to prevent impactions  Impaired fasting glucose  Recent A1c was 5 7 (prior was 5 3 and prior to that 6 0)  Recent FBS was 96  He is following diet and has no carbohydrates after his mid day    Obstructive sleep apnea syndrome  He has noted some dyspnea when he is out in [place] at altitude and his physician in [place] was recommended that he get a repeat sleep study and titration of his CPAP at altitude  He will be having a sleep study done in [place] in the near future  Of note when he is here in the [facility] his machine seems to work well and he feels well rested in the morning and has no dyspnea  Benign essential hypertension  Blood pressure is at target    Rosacea  His rosacea is generally controlled with his current medications and he tries to avoid significant some light her other irritants  Erectile dysfunction  We discussed this at length and he will try increasing the Cialis dose on days when he plans on having relations with his wife and he may try 10, 15, or 20 milligrams seen at either 1 dose or divided doses  I gave him a sample box so that he has extra pills in order to try this beyond his current prescription      Benign prostatic hyperplasia with lower urinary tract symptoms  He is found that the Cialis on a daily basis has been very effective in improving his BPH symptoms and resolving his urge leakage  Will continue his daily dosing for this purpose  Hyperlipidemia  Lipids are controlled and at target on his current dose of statin therapy  Continue the same    Primary hemochromatosis ([address] )  He follows with Dr Annette London for this and has an appointment soon    Elevated TSH  He has a new finding on recent labs of a TSH of 12 3 and a normal T4  We spent a significant amount of time discussing the relevance of this and I explained how the thyroid gland produces T3 and T4 and how the pituitary hypothalamic axis produces the TSH and what the significance of these are when they are abnormal   He has had no symptoms that would suggest that he had an acute thyroiditis  IA suggested that we recheck his TSH with a T3 and free T4 in approximately 1 month and if they remain abnormal then we could begin thyroid supplementation and titrate this until we get the appropriate dose and balance  He is agreeable to this approach  If any questions arise we can always get a endocrinology consult  At this time I did not order any thyroid antibodies were imaging  Diagnoses and all orders for this visit:    Impaired fasting glucose    Obstructive sleep apnea syndrome    Benign essential hypertension    Rosacea    Erectile dysfunction, unspecified erectile dysfunction type    Mixed hyperlipidemia    Primary hemochromatosis (HCC)    Elevated TSH  -     T3; Future  -     T4, free; Future  -     TSH, 3rd generation; Future    Benign prostatic hyperplasia with lower urinary tract symptoms, symptom details unspecified    Urge incontinence of urine    Other orders  -     Arginine 1000 MG TABS; Take by mouth  -     Glutamine 500 MG CAPS; Take by mouth  -     Biotin 300 MCG TABS; Take by mouth          Subjective:      Patient ID: Carline Bernard is a 70 y o  male      He is here today for follow-up on his chronic conditions    Generally he feels good  Is interested in concerned about his recent laboratory test results    He notes that he is been using Cialis 5 mg on a daily basis and that it has in fact resolved his urinary leakage which was urgent nature  He notes that it does not consistently help his erectile function and is about 50% effective  He has also tried taking an extra 5 mg on days where he anticipates having relationship and has found that this again improved about 50% from his other baseline    He reports no change in habits  He does not drink alcohol nor does he smoke  He continues to watch his diet well  He gets exercise on a regular basis   He lives part of the time here in the [facility] and part of the time in [place]  In [place] his mailbox is about 3/4 of a mi from the house with a changed elevation about 700 feet / elevation of home about 7000 feet    His physician in [place] has ordered a repeat sleep study at altitude which he is going to have done in [facility] in the near future to see if his CPAP machine is adequately treating his obstructive sleep apnea        The following portions of the patient's history were reviewed and updated as appropriate: allergies, current medications, past medical history, past social history, past surgical history and problem list     Review of Systems   Constitutional: Negative  HENT: Negative for dental problem, hearing loss and tinnitus  Eyes:        Has annual eye exam   Respiratory: Negative for cough, chest tightness and shortness of breath  Cardiovascular: Negative for chest pain, palpitations and leg swelling  Gastrointestinal:        BM regular  No dyspepsia or GERD   Genitourinary:        See HPI   Musculoskeletal: Negative for arthralgias, back pain, gait problem and myalgias  Skin:        Rosacea stable with erythromycin and cream   Neurological: Negative for dizziness, tremors and headaches  Psychiatric/Behavioral: Negative for confusion, decreased concentration and dysphoric mood   The patient is not nervous/anxious  Objective:      /74 (BP Location: Left arm, Patient Position: Sitting, Cuff Size: Adult)   Pulse 70   Temp 97 6 °F (36 4 °C) (Tympanic)   Resp 16   Ht 5' 6" (1 676 m)   Wt 78 kg (172 lb)   SpO2 96%   BMI 27 76 kg/m²          Physical Exam   Constitutional: He is oriented to person, place, and time  He appears well-developed and well-nourished  No distress  HENT:   Right ear has partial blockage with cerumen - irrigated   Eyes:   glasses   Neck: No JVD present  No thyromegaly present  Cardiovascular: Normal rate and regular rhythm  No murmur heard  Pulmonary/Chest: Effort normal and breath sounds normal    Musculoskeletal: He exhibits no edema  Lymphadenopathy:     He has no cervical adenopathy  Neurological: He is alert and oriented to person, place, and time  Skin: Rash (rosacea on face) noted  Psychiatric: He has a normal mood and affect  His behavior is normal  Judgment and thought content normal    Nursing note and vitals reviewed

## 2018-05-03 NOTE — ASSESSMENT & PLAN NOTE
Recent A1c was 5 7 (prior was 5 3 and prior to that 6 0)  Recent FBS was 96  He is following diet and has no carbohydrates after his mid day

## 2018-05-03 NOTE — ASSESSMENT & PLAN NOTE
We discussed this at length and he will try increasing the Cialis dose on days when he plans on having relations with his wife and he may try 10, 15, or 20 milligrams seen at either 1 dose or divided doses  I gave him a sample box so that he has extra pills in order to try this beyond his current prescription

## 2018-05-03 NOTE — ASSESSMENT & PLAN NOTE
He has noted some dyspnea when he is out in [place] at altitude and his physician in [place] was recommended that he get a repeat sleep study and titration of his CPAP at altitude  He will be having a sleep study done in [place] in the near future  Of note when he is here in the [facility] his machine seems to work well and he feels well rested in the morning and has no dyspnea

## 2018-05-03 NOTE — ASSESSMENT & PLAN NOTE
He has a new finding on recent labs of a TSH of 12 3 and a normal T4  We spent a significant amount of time discussing the relevance of this and I explained how the thyroid gland produces T3 and T4 and how the pituitary hypothalamic axis produces the TSH and what the significance of these are when they are abnormal   He has had no symptoms that would suggest that he had an acute thyroiditis  IA suggested that we recheck his TSH with a T3 and free T4 in approximately 1 month and if they remain abnormal then we could begin thyroid supplementation and titrate this until we get the appropriate dose and balance  He is agreeable to this approach  If any questions arise we can always get a endocrinology consult  At this time I did not order any thyroid antibodies were imaging

## 2018-05-03 NOTE — ASSESSMENT & PLAN NOTE
He is found that the Cialis on a daily basis has been very effective in improving his BPH symptoms and resolving his urge leakage  Will continue his daily dosing for this purpose  Recent PSA was 2 0 which is showing a slightly upward trend over the last several years from 1 3-1 7 and now this value  I explained that this is not uncommon as we H and will continue to monitor and 1 year

## 2018-05-04 ENCOUNTER — OFFICE VISIT (OUTPATIENT)
Dept: UROLOGY | Facility: CLINIC | Age: 63
End: 2018-05-04
Payer: MEDICARE

## 2018-05-04 ENCOUNTER — TELEPHONE (OUTPATIENT)
Dept: HEMATOLOGY ONCOLOGY | Facility: CLINIC | Age: 63
End: 2018-05-04

## 2018-05-04 VITALS
HEIGHT: 66 IN | BODY MASS INDEX: 27.64 KG/M2 | WEIGHT: 172 LBS | SYSTOLIC BLOOD PRESSURE: 140 MMHG | DIASTOLIC BLOOD PRESSURE: 66 MMHG

## 2018-05-04 DIAGNOSIS — R31.29 MICROSCOPIC HEMATURIA: Primary | ICD-10-CM

## 2018-05-04 DIAGNOSIS — N40.1 BPH WITH OBSTRUCTION/LOWER URINARY TRACT SYMPTOMS: ICD-10-CM

## 2018-05-04 DIAGNOSIS — N13.8 BPH WITH OBSTRUCTION/LOWER URINARY TRACT SYMPTOMS: ICD-10-CM

## 2018-05-04 LAB
AFP L3 MFR SERPL: NORMAL % (ref 0–9.9)
AFP SERPL-MCNC: 1.2 NG/ML (ref 0–8)
SL AMB  POCT GLUCOSE, UA: ABNORMAL
SL AMB LEUKOCYTE ESTERASE,UA: ABNORMAL
SL AMB POCT BILIRUBIN,UA: ABNORMAL
SL AMB POCT BLOOD,UA: ABNORMAL
SL AMB POCT CLARITY,UA: CLEAR
SL AMB POCT COLOR,UA: YELLOW
SL AMB POCT KETONES,UA: ABNORMAL
SL AMB POCT NITRITE,UA: ABNORMAL
SL AMB POCT PH,UA: 5
SL AMB POCT SPECIFIC GRAVITY,UA: ABNORMAL
SL AMB POCT URINE PROTEIN: ABNORMAL
SL AMB POCT UROBILINOGEN: ABNORMAL

## 2018-05-04 PROCEDURE — 88121 CYTP URINE 3-5 PROBES CMPTR: CPT | Performed by: UROLOGY

## 2018-05-04 PROCEDURE — 81002 URINALYSIS NONAUTO W/O SCOPE: CPT | Performed by: UROLOGY

## 2018-05-04 PROCEDURE — 99213 OFFICE O/P EST LOW 20 MIN: CPT | Performed by: UROLOGY

## 2018-05-04 NOTE — TELEPHONE ENCOUNTER
Called patient and made him aware that his ferritin level from 5/2/18 was 33  Patient does not need a therapeutic phlebotomy at this time  Cancelled the appt with the infusion center for 5/8/18

## 2018-05-07 ENCOUNTER — OFFICE VISIT (OUTPATIENT)
Dept: HEMATOLOGY ONCOLOGY | Facility: CLINIC | Age: 63
End: 2018-05-07
Payer: MEDICARE

## 2018-05-07 VITALS
HEIGHT: 66 IN | DIASTOLIC BLOOD PRESSURE: 62 MMHG | HEART RATE: 65 BPM | WEIGHT: 173.2 LBS | OXYGEN SATURATION: 96 % | TEMPERATURE: 97.3 F | BODY MASS INDEX: 27.83 KG/M2 | SYSTOLIC BLOOD PRESSURE: 114 MMHG | RESPIRATION RATE: 16 BRPM

## 2018-05-07 DIAGNOSIS — E83.110 PRIMARY HEMOCHROMATOSIS (HCC): Primary | ICD-10-CM

## 2018-05-07 PROCEDURE — 99214 OFFICE O/P EST MOD 30 MIN: CPT | Performed by: INTERNAL MEDICINE

## 2018-05-07 NOTE — PROGRESS NOTES
Follow-up office visit  HPI:   Ricardo Peraza is a 70 y o  male with primary hemochromatosis and he gets phlebotomized infrequently to keep ferritin between 50 and 100  This time ferritin is 33  No phlebotomy this time  He feels well  No symptoms other than history of microscopic hematuria and he follows with his urologist   He is trying to lose weight by not  taking carbohydrate after lunch and walking daily  Current Outpatient Prescriptions:     amiodarone 200 mg tablet, Take 200 mg by mouth daily, Disp: , Rfl:     Arginine 1000 MG TABS, Take by mouth, Disp: , Rfl:     aspirin 81 mg chewable tablet, Chew 81 mg daily, Disp: , Rfl:     atenolol (TENORMIN) 25 mg tablet, Take 25 mg by mouth daily, Disp: , Rfl:     atorvastatin (LIPITOR) 20 mg tablet, Take 20 mg by mouth daily, Disp: , Rfl: 3    Biotin 300 MCG TABS, Take by mouth, Disp: , Rfl:     ciprofloxacin (CILOXAN) 0 3 % ophthalmic solution, Apply 1 drop to eye, Disp: , Rfl:     Coenzyme Q10 (COQ-10) 200 MG CAPS, Take by mouth, Disp: , Rfl:     erythromycin with ethanol (EMGEL) 2 % gel, Apply topically, Disp: , Rfl:     folic acid (FOLVITE) 1 mg tablet, Take 1 tablet by mouth daily, Disp: , Rfl:     Glutamine 500 MG CAPS, Take by mouth, Disp: , Rfl:     Multiple Vitamins-Minerals (MULTI COMPLETE) CAPS, Take by mouth, Disp: , Rfl:     Omega-3 1000 MG CAPS, Take by mouth, Disp: , Rfl:     tadalafil (CIALIS) 5 MG tablet, Take by mouth, Disp: , Rfl:     No Known Allergies    ROS:  05/07/18 Reviewed 13 systems:  Presently no headaches, seizures, dizziness, diplopia, dysphagia, hoarseness, chest pain, palpitations, shortness of breath, cough, hemoptysis, abdominal pain, nausea, vomiting, change in bowel habits, melena,  fever, chills, bleeding, bone pains, skin rash, weight loss, arthritic symptoms, tiredness ,  weakness, numbness,  claudication and gait problem  No frequent infections  Not unusually sensitive to heat or cold   No swelling of the ankles  No swollen glands  Patient is not anxious  Other symptoms are in HPI        /62 (BP Location: Right arm, Cuff Size: Adult)   Pulse 65   Temp (!) 97 3 °F (36 3 °C) (Tympanic)   Resp 16   Ht 5' 6" (1 676 m)   Wt 78 6 kg (173 lb 3 2 oz)   SpO2 96%   BMI 27 96 kg/m²     Physical Exam:  Alert, oriented, not in distress, no icterus, no oral thrush, no palpable neck mass, clear lung fields, regular heart rate, abdomen  soft and non tender, no palpable abdominal mass, no ascites, no edema of ankles, no calf tenderness, no focal neurological deficit, no skin rash, no palpable lymphadenopathy, good arterial pulses, no clubbing  Patient is not anxious  Performance status 0  IMAGING:   Subcentimeter cyst in the liver    LABS:  Results for orders placed or performed in visit on 05/04/18   POCT urine dip   Result Value Ref Range    LEUKOCYTE ESTERASE,UA NEG      NITRITE,UA NEG     SL AMB POCT UROBILINOGEN N/A     SL AMB POCT URINE PROTEIN NEG      PH,UA 5      BLOOD,UA +++      SPECIFIC GRAVITY,UA N/A      KETONES,UA NEG      BILIRUBIN,UA N/A     GLUCOSE, UA NEG      COLOR,UA Yellow      CLARITY,UA Clear      Labs, Imaging, & Other studies:   All pertinent labs and imaging studies were personally reviewed    Lab Results   Component Value Date     05/02/2018    K 3 8 05/02/2018     05/02/2018    CO2 25 05/02/2018    ANIONGAP 11 05/02/2018    BUN 22 05/02/2018    CREATININE 0 97 05/02/2018    GLUCOSE 100 10/28/2016    GLUF 92 05/02/2018    CALCIUM 8 6 05/02/2018    AST 13 05/02/2018    ALT 18 05/02/2018    ALKPHOS 51 05/02/2018    PROT 7 3 05/02/2018    BILITOT 0 60 05/02/2018    EGFR 82 05/02/2018     Lab Results   Component Value Date    WBC 7 31 05/02/2018    HGB 15 3 05/02/2018    HCT 44 9 05/02/2018    MCV 90 05/02/2018     05/02/2018    Normal differential,  Alpha fetoprotein 1 2  Ferritin 33    Reviewed and discussed with patient      Assessment and plan:Blade Blake is a 70 y o  male with primary hemochromatosis and he gets phlebotomized infrequently to keep ferritin between 50 and 100  This time ferritin is 33  No phlebotomy this time  He feels well  No symptoms other than history of microscopic hematuria and he follows with his urologist   He is trying to lose weight by not  taking carbohydrate after lunch and walking daily  Physical examination and test results are as recorded and discussed in detail  Follow-up visit in 6 months with blood tests and maybe phlebotomy that day  Condition discussed and explained  Questions answered     Discussed the importance of eating healthy foods, avoiding iron rich foods, exercises as tolerated and health screening tests  1  Primary hemochromatosis ([facility] [address] )    - CBC and differential; Future  - Comprehensive metabolic panel; Future  - Ferritin; Future    Patient voiced understanding and agreement in the discussion  Counseling / Coordination of Care   Greater than 50% of total time was spent with the patient and / or family counseling and / or coordination of care

## 2018-05-07 NOTE — LETTER
May 7, 2018     Gelacio Lafleur, [address]    Patient: Jarrod Headley   YOB: 1955   Date of Visit: 5/7/2018       Dear Dr Zee Barbosa: Thank you for referring Tomasz Roxana to me for evaluation  Below are my notes for this consultation  If you have questions, please do not hesitate to call me  I look forward to following your patient along with you  Sincerely,        Davidson Ya MD        CC: No Recipients  Davidson Ya MD  5/7/2018 10:48 AM  Sign at close encounter  Follow-up office visit  HPI:   Jarrod Headley is a 70 y o  male with primary hemochromatosis and he gets phlebotomized infrequently to keep ferritin between 50 and 100  This time ferritin is 33  No phlebotomy this time  He feels well  No symptoms other than history of microscopic hematuria and he follows with his urologist   He is trying to lose weight by not  taking carbohydrate after lunch and walking daily          Current Outpatient Prescriptions:     amiodarone 200 mg tablet, Take 200 mg by mouth daily, Disp: , Rfl:     Arginine 1000 MG TABS, Take by mouth, Disp: , Rfl:     aspirin 81 mg chewable tablet, Chew 81 mg daily, Disp: , Rfl:     atenolol (TENORMIN) 25 mg tablet, Take 25 mg by mouth daily, Disp: , Rfl:     atorvastatin (LIPITOR) 20 mg tablet, Take 20 mg by mouth daily, Disp: , Rfl: 3    Biotin 300 MCG TABS, Take by mouth, Disp: , Rfl:     ciprofloxacin (CILOXAN) 0 3 % ophthalmic solution, Apply 1 drop to eye, Disp: , Rfl:     Coenzyme Q10 (COQ-10) 200 MG CAPS, Take by mouth, Disp: , Rfl:     erythromycin with ethanol (EMGEL) 2 % gel, Apply topically, Disp: , Rfl:     folic acid (FOLVITE) 1 mg tablet, Take 1 tablet by mouth daily, Disp: , Rfl:     Glutamine 500 MG CAPS, Take by mouth, Disp: , Rfl:     Multiple Vitamins-Minerals (MULTI COMPLETE) CAPS, Take by mouth, Disp: , Rfl:     Omega-3 1000 MG CAPS, Take by mouth, Disp: , Rfl:     tadalafil (CIALIS) 5 MG tablet, Take by mouth, Disp: , Rfl:     No Known Allergies    ROS:  05/07/18 Reviewed 13 systems:  Presently no headaches, seizures, dizziness, diplopia, dysphagia, hoarseness, chest pain, palpitations, shortness of breath, cough, hemoptysis, abdominal pain, nausea, vomiting, change in bowel habits, melena,  fever, chills, bleeding, bone pains, skin rash, weight loss, arthritic symptoms, tiredness ,  weakness, numbness,  claudication and gait problem  No frequent infections  Not unusually sensitive to heat or cold  No swelling of the ankles  No swollen glands  Patient is not anxious  Other symptoms are in HPI        /62 (BP Location: Right arm, Cuff Size: Adult)   Pulse 65   Temp (!) 97 3 °F (36 3 °C) (Tympanic)   Resp 16   Ht 5' 6" (1 676 m)   Wt 78 6 kg (173 lb 3 2 oz)   SpO2 96%   BMI 27 96 kg/m²      Physical Exam:  Alert, oriented, not in distress, no icterus, no oral thrush, no palpable neck mass, clear lung fields, regular heart rate, abdomen  soft and non tender, no palpable abdominal mass, no ascites, no edema of ankles, no calf tenderness, no focal neurological deficit, no skin rash, no palpable lymphadenopathy, good arterial pulses, no clubbing  Patient is not anxious  Performance status 0      IMAGING:   Subcentimeter cyst in the liver    LABS:  Results for orders placed or performed in visit on 05/04/18   POCT urine dip   Result Value Ref Range    LEUKOCYTE ESTERASE,UA NEG      NITRITE,UA NEG     SL AMB POCT UROBILINOGEN N/A     SL AMB POCT URINE PROTEIN NEG      PH,UA 5      BLOOD,UA +++      SPECIFIC GRAVITY,UA N/A      KETONES,UA NEG      BILIRUBIN,UA N/A     GLUCOSE, UA NEG      COLOR,UA Yellow      CLARITY,UA Clear      Labs, Imaging, & Other studies:   All pertinent labs and imaging studies were personally reviewed    Lab Results   Component Value Date     05/02/2018    K 3 8 05/02/2018     05/02/2018    CO2 25 05/02/2018    ANIONGAP 11 05/02/2018    BUN 22 05/02/2018 CREATININE 0 97 05/02/2018    GLUCOSE 100 10/28/2016    GLUF 92 05/02/2018    CALCIUM 8 6 05/02/2018    AST 13 05/02/2018    ALT 18 05/02/2018    ALKPHOS 51 05/02/2018    PROT 7 3 05/02/2018    BILITOT 0 60 05/02/2018    EGFR 82 05/02/2018     Lab Results   Component Value Date    WBC 7 31 05/02/2018    HGB 15 3 05/02/2018    HCT 44 9 05/02/2018    MCV 90 05/02/2018     05/02/2018    Normal differential,  Alpha fetoprotein 1 2  Ferritin 33    Reviewed and discussed with patient  Assessment and plan:Blade Alvarez is a 70 y o  male with primary hemochromatosis and he gets phlebotomized infrequently to keep ferritin between 50 and 100  This time ferritin is 33  No phlebotomy this time  He feels well  No symptoms other than history of microscopic hematuria and he follows with his urologist   He is trying to lose weight by not  taking carbohydrate after lunch and walking daily  Physical examination and test results are as recorded and discussed in detail  Follow-up visit in 6 months with blood tests and maybe phlebotomy that day  Condition discussed and explained  Questions answered     Discussed the importance of eating healthy foods, avoiding iron rich foods, exercises as tolerated and health screening tests  1  Primary hemochromatosis ([address] )    - CBC and differential; Future  - Comprehensive metabolic panel; Future  - Ferritin; Future    Patient voiced understanding and agreement in the discussion  Counseling / Coordination of Care   Greater than 50% of total time was spent with the patient and / or family counseling and / or coordination of care

## 2018-05-07 NOTE — PROGRESS NOTES
Progress Note - Urology  Brody Benitez 70 y o  male MRN: [id number]  Encounter: [id number]      Chief Complaint:   Chief Complaint   Patient presents with    Benign Prostatic Hypertrophy     1 Year FU / PSA 2 0 (05/02/18)       HPI:   this 24-year-old male has been seen previously for microscopic hematuria  Previous workups have revealed a 5 mm lower pole left renal calculus  He does not have any gross hematuria  He denies any significant lower tract symptoms  AUA index is 3  PSA screening is 2 0  He had cysto bilateral retrogrades and imaging studies in March 2015 which were unremarkable other than the lower pole stone on the left kidney  He denies being a cigarette smoker  He is otherwise feeling well has no weight loss        MEDS:    Current Outpatient Prescriptions:     amiodarone 200 mg tablet, Take 200 mg by mouth daily, Disp: , Rfl:     Arginine 1000 MG TABS, Take by mouth, Disp: , Rfl:     aspirin 81 mg chewable tablet, Chew 81 mg daily, Disp: , Rfl:     atenolol (TENORMIN) 25 mg tablet, Take 25 mg by mouth daily, Disp: , Rfl:     atorvastatin (LIPITOR) 20 mg tablet, Take 20 mg by mouth daily, Disp: , Rfl: 3    Biotin 300 MCG TABS, Take by mouth, Disp: , Rfl:     ciprofloxacin (CILOXAN) 0 3 % ophthalmic solution, Apply 1 drop to eye, Disp: , Rfl:     Coenzyme Q10 (COQ-10) 200 MG CAPS, Take by mouth, Disp: , Rfl:     erythromycin with ethanol (EMGEL) 2 % gel, Apply topically, Disp: , Rfl:     folic acid (FOLVITE) 1 mg tablet, Take 1 tablet by mouth daily, Disp: , Rfl:     Glutamine 500 MG CAPS, Take by mouth, Disp: , Rfl:     Multiple Vitamins-Minerals (MULTI COMPLETE) CAPS, Take by mouth, Disp: , Rfl:     Omega-3 1000 MG CAPS, Take by mouth, Disp: , Rfl:     tadalafil (CIALIS) 5 MG tablet, Take by mouth, Disp: , Rfl:       PMH:  Past Medical History:   Diagnosis Date    Chronic constipation     last assessed 27Apr2015    Hematuria     last assessed 27Apr2015    Hemochromatosis     Hypertension     Non-neoplastic nevus     last assessed [id number]         ROS:  Review of Systems   Constitutional: Negative  Respiratory: Negative  Cardiovascular: Negative  Neurological: Negative  Psychiatric/Behavioral: Negative  Vitals:  Blood pressure 140/66, height 5' 6" (1 676 m), weight 78 kg (172 lb)  Physical Exam:     68-year-old male appearing stated age in good general standing  The back reveals no CVA tenderness  His abdomen is soft without tenderness  There is no liver or spleen enlargement  No abdominal masses appreciated  No inguinal adenopathy noted  No hernia defect noted  Penis is unremarkable testes are unremarkable  Rectal tone is normal   Prostate is symmetrical grossly benign roughly 25-30 g benign      Lab, Imaging and other studies:  No results found for this or any previous visit (from the past 48 hour(s))  IMPRESSION:   1  BPH without lower tract symptoms   2   Micro hematuria    PLAN:   I will recheck a urine cytology ([facility])   recheck in 1 year unless this is abnormal

## 2018-05-08 ENCOUNTER — HOSPITAL ENCOUNTER (OUTPATIENT)
Dept: INFUSION CENTER | Facility: HOSPITAL | Age: 63
Discharge: HOME | End: 2018-05-08
Payer: MEDICARE

## 2018-05-09 ENCOUNTER — TELEPHONE (OUTPATIENT)
Dept: FAMILY MEDICINE CLINIC | Facility: CLINIC | Age: 63
End: 2018-05-09

## 2018-05-10 NOTE — TELEPHONE ENCOUNTER
Yes   I made note in his chart at last visit that the daily Cialis has improved his BPH symptoms and urge leakage  It is an improved indication for BPH symptoms although as noted may require pre-certification or exception

## 2018-05-10 NOTE — TELEPHONE ENCOUNTER
Dr Reyes Quach, would it be appropriate to reorder this medication with that covered diagnosis of BPH?

## 2018-05-24 ENCOUNTER — TELEPHONE (OUTPATIENT)
Dept: FAMILY MEDICINE CLINIC | Facility: CLINIC | Age: 63
End: 2018-05-24

## 2018-05-31 ENCOUNTER — TELEPHONE (OUTPATIENT)
Dept: FAMILY MEDICINE CLINIC | Facility: CLINIC | Age: 63
End: 2018-05-31

## 2018-06-01 NOTE — TELEPHONE ENCOUNTER
We received and faxed back a form requesting information regarding the prescription  Dr Jossy Singh came in and was able to sign the form for me to send it

## 2018-06-07 DIAGNOSIS — N40.1 BENIGN PROSTATIC HYPERPLASIA WITH LOWER URINARY TRACT SYMPTOMS, SYMPTOM DETAILS UNSPECIFIED: Primary | ICD-10-CM

## 2018-06-07 RX ORDER — TADALAFIL 5 MG/1
5 TABLET ORAL AS NEEDED
Qty: 10 TABLET | Refills: 3 | Status: SHIPPED | OUTPATIENT
Start: 2018-06-07 | End: 2018-08-28 | Stop reason: SDUPTHER

## 2018-08-28 DIAGNOSIS — L71.9 ROSACEA: Primary | ICD-10-CM

## 2018-08-28 DIAGNOSIS — N40.1 BENIGN PROSTATIC HYPERPLASIA WITH LOWER URINARY TRACT SYMPTOMS, SYMPTOM DETAILS UNSPECIFIED: ICD-10-CM

## 2018-08-28 RX ORDER — ERYTHROMYCIN 20 MG/G
GEL TOPICAL DAILY
Qty: 90 G | Refills: 3 | Status: SHIPPED | OUTPATIENT
Start: 2018-08-28

## 2018-08-28 RX ORDER — TADALAFIL 5 MG/1
5 TABLET ORAL AS NEEDED
Qty: 10 TABLET | Refills: 5 | Status: SHIPPED | OUTPATIENT
Start: 2018-08-28

## 2018-08-28 NOTE — TELEPHONE ENCOUNTER
Patient wants 90 day supplys on Erythromycin (emgel) 2% and Cialis 5 mg to [facility] pharmacy [place]

## 2018-10-05 ENCOUNTER — APPOINTMENT (OUTPATIENT)
Dept: LAB | Facility: CLINIC | Age: 63
End: 2018-10-05
Payer: MEDICARE

## 2018-10-05 DIAGNOSIS — E78.5 HYPERLIPIDEMIA, UNSPECIFIED HYPERLIPIDEMIA TYPE: ICD-10-CM

## 2018-10-05 DIAGNOSIS — I10 ESSENTIAL HYPERTENSION, BENIGN: Primary | ICD-10-CM

## 2018-10-05 DIAGNOSIS — E83.110 PRIMARY HEMOCHROMATOSIS (HCC): ICD-10-CM

## 2018-10-05 LAB
ALBUMIN SERPL BCP-MCNC: 4.1 G/DL (ref 3.5–5)
ALP SERPL-CCNC: 59 U/L (ref 46–116)
ALT SERPL W P-5'-P-CCNC: 22 U/L (ref 12–78)
ANION GAP SERPL CALCULATED.3IONS-SCNC: 4 MMOL/L (ref 4–13)
AST SERPL W P-5'-P-CCNC: 17 U/L (ref 5–45)
BASOPHILS # BLD AUTO: 0.04 THOUSANDS/ΜL (ref 0–0.1)
BASOPHILS NFR BLD AUTO: 1 % (ref 0–1)
BILIRUB SERPL-MCNC: 0.66 MG/DL (ref 0.2–1)
BUN SERPL-MCNC: 19 MG/DL (ref 5–25)
CALCIUM SERPL-MCNC: 9 MG/DL (ref 8.3–10.1)
CHLORIDE SERPL-SCNC: 106 MMOL/L (ref 100–108)
CHOLEST SERPL-MCNC: 135 MG/DL (ref 50–200)
CK SERPL-CCNC: 97 U/L (ref 39–308)
CO2 SERPL-SCNC: 31 MMOL/L (ref 21–32)
CREAT SERPL-MCNC: 1.03 MG/DL (ref 0.6–1.3)
CRP SERPL HS-MCNC: <0.9 MG/L
EOSINOPHIL # BLD AUTO: 0.05 THOUSAND/ΜL (ref 0–0.61)
EOSINOPHIL NFR BLD AUTO: 1 % (ref 0–6)
ERYTHROCYTE [DISTWIDTH] IN BLOOD BY AUTOMATED COUNT: 13.6 % (ref 11.6–15.1)
FERRITIN SERPL-MCNC: 34 NG/ML (ref 8–388)
GFR SERPL CREATININE-BSD FRML MDRD: 76 ML/MIN/1.73SQ M
GLUCOSE P FAST SERPL-MCNC: 111 MG/DL (ref 65–99)
HCT VFR BLD AUTO: 45.4 % (ref 36.5–49.3)
HDLC SERPL-MCNC: 61 MG/DL (ref 40–60)
HGB BLD-MCNC: 15.4 G/DL (ref 12–17)
IMM GRANULOCYTES # BLD AUTO: 0.02 THOUSAND/UL (ref 0–0.2)
IMM GRANULOCYTES NFR BLD AUTO: 0 % (ref 0–2)
LDLC SERPL CALC-MCNC: 67 MG/DL (ref 0–100)
LDLC SERPL DIRECT ASSAY-MCNC: 64 MG/DL (ref 0–100)
LYMPHOCYTES # BLD AUTO: 1.3 THOUSANDS/ΜL (ref 0.6–4.47)
LYMPHOCYTES NFR BLD AUTO: 21 % (ref 14–44)
MAGNESIUM SERPL-MCNC: 2.2 MG/DL (ref 1.6–2.6)
MCH RBC QN AUTO: 30.6 PG (ref 26.8–34.3)
MCHC RBC AUTO-ENTMCNC: 33.9 G/DL (ref 31.4–37.4)
MCV RBC AUTO: 90 FL (ref 82–98)
MONOCYTES # BLD AUTO: 0.42 THOUSAND/ΜL (ref 0.17–1.22)
MONOCYTES NFR BLD AUTO: 7 % (ref 4–12)
NEUTROPHILS # BLD AUTO: 4.48 THOUSANDS/ΜL (ref 1.85–7.62)
NEUTS SEG NFR BLD AUTO: 70 % (ref 43–75)
NONHDLC SERPL-MCNC: 74 MG/DL
NRBC BLD AUTO-RTO: 0 /100 WBCS
PLATELET # BLD AUTO: 224 THOUSANDS/UL (ref 149–390)
PMV BLD AUTO: 9.8 FL (ref 8.9–12.7)
POTASSIUM SERPL-SCNC: 3.8 MMOL/L (ref 3.5–5.3)
PROT SERPL-MCNC: 7.2 G/DL (ref 6.4–8.2)
RBC # BLD AUTO: 5.04 MILLION/UL (ref 3.88–5.62)
SODIUM SERPL-SCNC: 141 MMOL/L (ref 136–145)
TRIGL SERPL-MCNC: 34 MG/DL
TSH SERPL DL<=0.05 MIU/L-ACNC: 2.71 UIU/ML (ref 0.36–3.74)
WBC # BLD AUTO: 6.31 THOUSAND/UL (ref 4.31–10.16)

## 2018-10-05 PROCEDURE — 82728 ASSAY OF FERRITIN: CPT

## 2018-10-05 PROCEDURE — 36415 COLL VENOUS BLD VENIPUNCTURE: CPT

## 2018-10-05 PROCEDURE — 80061 LIPID PANEL: CPT

## 2018-10-05 PROCEDURE — 82550 ASSAY OF CK (CPK): CPT

## 2018-10-05 PROCEDURE — 84443 ASSAY THYROID STIM HORMONE: CPT

## 2018-10-05 PROCEDURE — 86141 C-REACTIVE PROTEIN HS: CPT

## 2018-10-05 PROCEDURE — 80053 COMPREHEN METABOLIC PANEL: CPT

## 2018-10-05 PROCEDURE — 83721 ASSAY OF BLOOD LIPOPROTEIN: CPT

## 2018-10-05 PROCEDURE — 85025 COMPLETE CBC W/AUTO DIFF WBC: CPT

## 2018-10-05 PROCEDURE — 83735 ASSAY OF MAGNESIUM: CPT

## 2018-10-15 ENCOUNTER — TELEPHONE (OUTPATIENT)
Dept: HEMATOLOGY ONCOLOGY | Facility: CLINIC | Age: 63
End: 2018-10-15

## 2018-10-15 NOTE — TELEPHONE ENCOUNTER
----- Message from Keara Tomas RN sent at 10/15/2018  3:35 PM EDT -----  Regarding: FW: Visit Follow-Up Question  Contact: [phone]  Patient's ferritin level was 34  His goal is a ferritin level between 50 and 100  He does not need a phlebotomy at this time  He probably can be rescheduled out a couple of months        ----- Message -----  From: Alex Muñoz MA  Sent: 10/15/2018   3:25 PM  To: Keara Tomas RN  Subject: FW: Visit Follow-Up Question                     Please review and let me know what information you would like me to call the patient and inform him      ----- Message -----  From: Jesenia Thakkar  Sent: 10/15/2018   3:18 PM  To: [facility]  Subject: Visit Follow-Up Question                         Please review my recent labs done at [facility] [place] will I need a phlebotomy during my appointment of 9 November 2018? Any reason not to cancel the appointment if I don't need a phlebotomy? postpone a few months?

## 2018-11-09 ENCOUNTER — OFFICE VISIT (OUTPATIENT)
Dept: HEMATOLOGY ONCOLOGY | Facility: CLINIC | Age: 63
End: 2018-11-09
Payer: MEDICARE

## 2018-11-09 VITALS
SYSTOLIC BLOOD PRESSURE: 122 MMHG | HEART RATE: 51 BPM | WEIGHT: 170.2 LBS | DIASTOLIC BLOOD PRESSURE: 84 MMHG | HEIGHT: 67 IN | RESPIRATION RATE: 18 BRPM | TEMPERATURE: 98.7 F | OXYGEN SATURATION: 97 % | BODY MASS INDEX: 26.71 KG/M2

## 2018-11-09 DIAGNOSIS — E83.110 PRIMARY HEMOCHROMATOSIS (HCC): Primary | ICD-10-CM

## 2018-11-09 PROCEDURE — 99213 OFFICE O/P EST LOW 20 MIN: CPT | Performed by: INTERNAL MEDICINE

## 2018-11-09 RX ORDER — POTASSIUM CHLORIDE 750 MG/1
10 CAPSULE, EXTENDED RELEASE ORAL 2 TIMES DAILY
COMMUNITY

## 2018-11-09 RX ORDER — LEVOTHYROXINE SODIUM 0.1 MG/1
100 TABLET ORAL DAILY
Refills: 5 | COMMUNITY
Start: 2018-10-20

## 2018-11-09 NOTE — PROGRESS NOTES
HPI:  Follow-up visit for primary hemochromatosis and he had not have phlebotomy for about a year  He is feeling well  No symptoms  [place]  Current Outpatient Prescriptions:     amiodarone 200 mg tablet, Take 200 mg by mouth daily, Disp: , Rfl:     Arginine 1000 MG TABS, Take by mouth, Disp: , Rfl:     aspirin 81 mg chewable tablet, Chew 81 mg daily, Disp: , Rfl:     atenolol (TENORMIN) 25 mg tablet, Take 25 mg by mouth daily, Disp: , Rfl:     atorvastatin (LIPITOR) 20 mg tablet, Take 20 mg by mouth daily, Disp: , Rfl: 3    Biotin 300 MCG TABS, Take by mouth, Disp: , Rfl:     ciprofloxacin (CILOXAN) 0 3 % ophthalmic solution, Apply 1 drop to eye, Disp: , Rfl:     Coenzyme Q10 (COQ-10) 200 MG CAPS, Take by mouth, Disp: , Rfl:     erythromycin with ethanol (EMGEL) 2 % gel, Apply topically daily, Disp: 90 g, Rfl: 3    folic acid (FOLVITE) 1 mg tablet, Take 1 tablet by mouth daily, Disp: , Rfl:     Glutamine 500 MG CAPS, Take by mouth, Disp: , Rfl:     levothyroxine 100 mcg tablet, Take 100 mcg by mouth daily, Disp: , Rfl: 5    Multiple Vitamins-Minerals (MULTI COMPLETE) CAPS, Take by mouth, Disp: , Rfl:     Omega-3 1000 MG CAPS, Take by mouth, Disp: , Rfl:     potassium chloride (MICRO-K) 10 MEQ CR capsule, Take 10 mEq by mouth 2 (two) times a day, Disp: , Rfl:     tadalafil (CIALIS) 5 MG tablet, Take 1 tablet (5 mg total) by mouth as needed for erectile dysfunction, Disp: 10 tablet, Rfl: 5    Allergies   Allergen Reactions    Pollen Extract          ROS:  11/09/18 Reviewed 13 systems:  Presently no headaches, seizures, dizziness, diplopia, dysphagia, hoarseness, chest pain, palpitations, shortness of breath, cough, hemoptysis, abdominal pain, nausea, vomiting, change in bowel habits, melena, hematuria, fever, chills, bleeding, bone pains, skin rash, weight loss, arthritic symptoms, tiredness ,  weakness, numbness,  claudication and gait problem   No frequent infections  Not unusually sensitive to heat or cold  No swelling of the ankles  No swollen glands  Patient is anxious  Other symptoms are in HPI        /84 (BP Location: Left arm, Patient Position: Sitting, Cuff Size: Adult)   Pulse (!) 51   Temp 98 7 °F (37 1 °C)   Resp 18   Ht 5' 7" (1 702 m)   Wt 77 2 kg (170 lb 3 2 oz)   SpO2 97%   BMI 26 66 kg/m²     Physical Exam:  Alert, oriented, not in distress, no icterus, no oral thrush, no palpable neck mass, clear lung fields, regular heart rate, abdomen  soft and non tender, no palpable abdominal mass, no ascites, no edema of ankles, no calf tenderness, no focal neurological deficit, no skin rash, no palpable lymphadenopathy, good arterial pulses, no clubbing  Patient is anxious  Performance status 0      IMAGING:  No orders to display       LABS:  Results for orders placed or performed in visit on 10/05/18   CBC and differential   Result Value Ref Range    WBC 6 31 4 31 - 10 16 Thousand/uL    RBC 5 04 3 88 - 5 62 Million/uL    Hemoglobin 15 4 12 0 - 17 0 g/dL    Hematocrit 45 4 36 5 - 49 3 %    MCV 90 82 - 98 fL    MCH 30 6 26 8 - 34 3 pg    MCHC 33 9 31 4 - 37 4 g/dL    RDW 13 6 11 6 - 15 1 %    MPV 9 8 8 9 - 12 7 fL    Platelets 696 623 - 726 Thousands/uL    nRBC 0 /100 WBCs    Neutrophils Relative 70 43 - 75 %    Immat GRANS % 0 0 - 2 %    Lymphocytes Relative 21 14 - 44 %    Monocytes Relative 7 4 - 12 %    Eosinophils Relative 1 0 - 6 %    Basophils Relative 1 0 - 1 %    Neutrophils Absolute 4 48 1 85 - 7 62 Thousands/µL    Immature Grans Absolute 0 02 0 00 - 0 20 Thousand/uL    Lymphocytes Absolute 1 30 0 60 - 4 47 Thousands/µL    Monocytes Absolute 0 42 0 17 - 1 22 Thousand/µL    Eosinophils Absolute 0 05 0 00 - 0 61 Thousand/µL    Basophils Absolute 0 04 0 00 - 0 10 Thousands/µL   Comprehensive metabolic panel   Result Value Ref Range    Sodium 141 136 - 145 mmol/L    Potassium 3 8 3 5 - 5 3 mmol/L    Chloride 106 100 - 108 mmol/L CO2 31 21 - 32 mmol/L    ANION GAP 4 4 - 13 mmol/L    BUN 19 5 - 25 mg/dL    Creatinine 1 03 0 60 - 1 30 mg/dL    Glucose, Fasting 111 (H) 65 - 99 mg/dL    Calcium 9 0 8 3 - 10 1 mg/dL    AST 17 5 - 45 U/L    ALT 22 12 - 78 U/L    Alkaline Phosphatase 59 46 - 116 U/L    Total Protein 7 2 6 4 - 8 2 g/dL    Albumin 4 1 3 5 - 5 0 g/dL    Total Bilirubin 0 66 0 20 - 1 00 mg/dL    eGFR 76 ml/min/1 73sq m   Ferritin   Result Value Ref Range    Ferritin 34 8 - 388 ng/mL   High sensitivity CRP   Result Value Ref Range    CRP, High Sensitivity <0 90 mg/L   CK (with reflex to MB)   Result Value Ref Range    Total CK 97 39 - 308 U/L   Lipid panel   Result Value Ref Range    Cholesterol 135 50 - 200 mg/dL    Triglycerides 34 <=150 mg/dL    HDL, Direct 61 (H) 40 - 60 mg/dL    LDL Calculated 67 0 - 100 mg/dL    Non-HDL-Chol (CHOL-HDL) 74 mg/dl   LDL cholesterol, direct   Result Value Ref Range    LDL Direct 64 0 - 100 mg/dl   Magnesium   Result Value Ref Range    Magnesium 2 2 1 6 - 2 6 mg/dL   TSH, 3rd generation   Result Value Ref Range    TSH 3RD GENERATON 2 710 0 358 - 3 740 uIU/mL     Labs, Imaging, & Other studies:   All pertinent labs and imaging studies were personally reviewed    Lab Results   Component Value Date     03/12/2015    K 3 8 10/05/2018     10/05/2018    CO2 31 10/05/2018    ANIONGAP 9 03/12/2015    BUN 19 10/05/2018    CREATININE 1 03 10/05/2018    GLUCOSE 104 03/12/2015    GLUF 111 (H) 10/05/2018    CALCIUM 9 0 10/05/2018    AST 17 10/05/2018    ALT 22 10/05/2018    ALKPHOS 59 10/05/2018    PROT 7 7 08/21/2015    BILITOT 0 57 08/21/2015    EGFR 76 10/05/2018     Lab Results   Component Value Date    WBC 6 31 10/05/2018    HGB 15 4 10/05/2018    HCT 45 4 10/05/2018    MCV 90 10/05/2018     10/05/2018   No results found for: SEDRATE    Reviewed and discussed with patient  Assessment and plan: Follow-up visit for primary hemochromatosis and he had not have phlebotomy for about a year  He is feeling well  No symptoms  [place]     No symptoms  No abnormal positive sign  No need for phlebotomy this time  Ferritin is 34  Hemoglobin 15 4  He will come back in 1 year with blood tests and ultrasound of abdomen  Condition discussed and explained  Questions answered  Discussed the importance of eating healthy foods but not rich in iron  Staying active and health screening tests  He is capable of self-care  1  Primary hemochromatosis ([address] )    - CBC and differential; Future  - Comprehensive metabolic panel; Future  - Ferritin; Future  - AFP tumor marker; Future  - US abdomen complete; Future      Patient voiced understanding and agreement in the discussion  Counseling / Coordination of Care   Greater than 50% of total time was spent with the patient and / or family counseling and / or coordination of care

## 2019-01-06 DIAGNOSIS — I10 ESSENTIAL HYPERTENSION: Primary | ICD-10-CM

## 2019-01-07 RX ORDER — ATENOLOL 25 MG/1
TABLET ORAL
Qty: 90 TABLET | Refills: 3 | Status: SHIPPED | OUTPATIENT
Start: 2019-01-07

## 2019-01-11 ENCOUNTER — TELEPHONE (OUTPATIENT)
Dept: HEMATOLOGY ONCOLOGY | Facility: MEDICAL CENTER | Age: 64
End: 2019-01-11

## 2021-05-11 NOTE — ASSESSMENT & PLAN NOTE
His rosacea is generally controlled with his current medications and he tries to avoid significant some light her other irritants  braeden

## 2023-08-05 ENCOUNTER — APPOINTMENT (OUTPATIENT)
Dept: RADIOLOGY | Facility: HOSPITAL | Age: 68
End: 2023-08-05
Payer: MEDICARE

## 2023-08-05 ENCOUNTER — HOSPITAL ENCOUNTER (EMERGENCY)
Facility: HOSPITAL | Age: 68
Discharge: HOME | End: 2023-08-05
Attending: EMERGENCY MEDICINE | Admitting: EMERGENCY MEDICINE
Payer: MEDICARE

## 2023-08-05 VITALS
DIASTOLIC BLOOD PRESSURE: 87 MMHG | HEART RATE: 80 BPM | SYSTOLIC BLOOD PRESSURE: 190 MMHG | TEMPERATURE: 98.5 F | HEIGHT: 67 IN | RESPIRATION RATE: 18 BRPM | BODY MASS INDEX: 26.68 KG/M2 | WEIGHT: 170 LBS | OXYGEN SATURATION: 98 %

## 2023-08-05 DIAGNOSIS — K59.00 CONSTIPATION: Primary | ICD-10-CM

## 2023-08-05 LAB
ALBUMIN SERPL BCP-MCNC: 4.4 G/DL (ref 3.5–5)
ALP SERPL-CCNC: 50 U/L (ref 34–104)
ALT SERPL W P-5'-P-CCNC: 13 U/L (ref 7–52)
ANION GAP SERPL CALCULATED.3IONS-SCNC: 7 MMOL/L
AST SERPL W P-5'-P-CCNC: 15 U/L (ref 13–39)
BASOPHILS # BLD AUTO: 0.04 THOUSANDS/ÂΜL (ref 0–0.1)
BASOPHILS NFR BLD AUTO: 0 % (ref 0–1)
BILIRUB SERPL-MCNC: 0.66 MG/DL (ref 0.2–1)
BUN SERPL-MCNC: 20 MG/DL (ref 5–25)
CALCIUM SERPL-MCNC: 9.5 MG/DL (ref 8.4–10.2)
CHLORIDE SERPL-SCNC: 108 MMOL/L (ref 96–108)
CO2 SERPL-SCNC: 25 MMOL/L (ref 21–32)
CREAT SERPL-MCNC: 0.93 MG/DL (ref 0.6–1.3)
EOSINOPHIL # BLD AUTO: 0.03 THOUSAND/ÂΜL (ref 0–0.61)
EOSINOPHIL NFR BLD AUTO: 0 % (ref 0–6)
ERYTHROCYTE [DISTWIDTH] IN BLOOD BY AUTOMATED COUNT: 14.3 % (ref 11.6–15.1)
GFR SERPL CREATININE-BSD FRML MDRD: 82 ML/MIN/1.73SQ M
GLUCOSE SERPL-MCNC: 130 MG/DL (ref 65–140)
HCT VFR BLD AUTO: 43.2 % (ref 36.5–49.3)
HGB BLD-MCNC: 14.5 G/DL (ref 12–17)
IMM GRANULOCYTES # BLD AUTO: 0.04 THOUSAND/UL (ref 0–0.2)
IMM GRANULOCYTES NFR BLD AUTO: 0 % (ref 0–2)
LIPASE SERPL-CCNC: 30 U/L (ref 11–82)
LYMPHOCYTES # BLD AUTO: 1.34 THOUSANDS/ÂΜL (ref 0.6–4.47)
LYMPHOCYTES NFR BLD AUTO: 15 % (ref 14–44)
MCH RBC QN AUTO: 28.9 PG (ref 26.8–34.3)
MCHC RBC AUTO-ENTMCNC: 33.6 G/DL (ref 31.4–37.4)
MCV RBC AUTO: 86 FL (ref 82–98)
MONOCYTES # BLD AUTO: 0.52 THOUSAND/ÂΜL (ref 0.17–1.22)
MONOCYTES NFR BLD AUTO: 6 % (ref 4–12)
NEUTROPHILS # BLD AUTO: 7.06 THOUSANDS/ÂΜL (ref 1.85–7.62)
NEUTS SEG NFR BLD AUTO: 79 % (ref 43–75)
NRBC BLD AUTO-RTO: 0 /100 WBCS
PLATELET # BLD AUTO: 260 THOUSANDS/UL (ref 149–390)
PMV BLD AUTO: 9 FL (ref 8.9–12.7)
POTASSIUM SERPL-SCNC: 3.9 MMOL/L (ref 3.5–5.3)
PROT SERPL-MCNC: 7.1 G/DL (ref 6.4–8.4)
RBC # BLD AUTO: 5.01 MILLION/UL (ref 3.88–5.62)
SODIUM SERPL-SCNC: 140 MMOL/L (ref 135–147)
WBC # BLD AUTO: 9.03 THOUSAND/UL (ref 4.31–10.16)

## 2023-08-05 PROCEDURE — 36415 COLL VENOUS BLD VENIPUNCTURE: CPT

## 2023-08-05 PROCEDURE — 99283 EMERGENCY DEPT VISIT LOW MDM: CPT

## 2023-08-05 PROCEDURE — 80053 COMPREHEN METABOLIC PANEL: CPT | Performed by: EMERGENCY MEDICINE

## 2023-08-05 PROCEDURE — 85025 COMPLETE CBC W/AUTO DIFF WBC: CPT | Performed by: EMERGENCY MEDICINE

## 2023-08-05 PROCEDURE — 83690 ASSAY OF LIPASE: CPT | Performed by: EMERGENCY MEDICINE

## 2023-08-05 PROCEDURE — 74018 RADEX ABDOMEN 1 VIEW: CPT

## 2023-08-05 NOTE — ED PROVIDER NOTES
History  Chief Complaint   Patient presents with   • Constipation     Patient presents to ED with constipation x1 day, patient reports feeling like he has to go but cant. History from patient past medical history chronic constipation pilonidal cyst excised, hypothyroid, hypertension. Patient presents with concern for constipation last normal bowel movement yesterday at 4 PM.  He feels like he has to go but there is only loose stool coming out he can get the rest out. Symptoms started this morning. He has no abdominal pain. He recently traveled to the area couple days ago. Prior to Admission Medications   Prescriptions Last Dose Informant Patient Reported? Taking?    Arginine 1000 MG TABS  Self Yes No   Sig: Take by mouth   Biotin 300 MCG TABS  Self Yes No   Sig: Take by mouth   Coenzyme Q10 (COQ-10) 200 MG CAPS  Self Yes No   Sig: Take by mouth   Glutamine 500 MG CAPS  Self Yes No   Sig: Take by mouth   Multiple Vitamins-Minerals (MULTI COMPLETE) CAPS  Self Yes No   Sig: Take by mouth   Omega-3 1000 MG CAPS  Self Yes No   Sig: Take by mouth   amiodarone 200 mg tablet  Self Yes No   Sig: Take 200 mg by mouth daily   aspirin 81 mg chewable tablet  Self Yes No   Sig: Chew 81 mg daily   atenolol (TENORMIN) 25 mg tablet   No No   Sig: TAKE 1 TABLET EVERY DAY AS DIRECTED   atorvastatin (LIPITOR) 20 mg tablet  Self Yes No   Sig: Take 20 mg by mouth daily   ciprofloxacin (CILOXAN) 0.3 % ophthalmic solution  Self Yes No   Sig: Apply 1 drop to eye   erythromycin with ethanol (EMGEL) 2 % gel  Self No No   Sig: Apply topically daily   folic acid (FOLVITE) 1 mg tablet  Self Yes No   Sig: Take 1 tablet by mouth daily   levothyroxine 100 mcg tablet  Self Yes No   Sig: Take 100 mcg by mouth daily   potassium chloride (MICRO-K) 10 MEQ CR capsule  Self Yes No   Sig: Take 10 mEq by mouth 2 (two) times a day   tadalafil (CIALIS) 5 MG tablet  Self No No   Sig: Take 1 tablet (5 mg total) by mouth as needed for erectile dysfunction      Facility-Administered Medications: None       Past Medical History:   Diagnosis Date   • Chronic constipation     last assessed [id number]   • Hematuria     last assessed [id number]   • Hemochromatosis    • Hypertension    • Non-neoplastic nevus     last assessed [id number]       Past Surgical History:   Procedure Laterality Date   • COLONOSCOPY      resolved 2009   • PILONIDAL CYST EXCISION     • WISDOM TOOTH EXTRACTION         Family History   Problem Relation Age of Onset   • Rheum arthritis Mother    • Leukemia Mother    • Stroke Father      I have reviewed and agree with the history as documented. E-Cigarette/Vaping     E-Cigarette/Vaping Substances     Social History     Tobacco Use   • Smoking status: Never   • Smokeless tobacco: Never   Substance Use Topics   • Alcohol use: No   • Drug use: No       Review of Systems   Constitutional: Negative for chills and fever. HENT: Negative for rhinorrhea and sore throat. Respiratory: Negative for shortness of breath. Cardiovascular: Negative for chest pain. Gastrointestinal: Positive for constipation. Negative for abdominal pain, blood in stool, diarrhea, nausea and vomiting. Genitourinary: Negative for dysuria and frequency. Skin: Negative for rash. All other systems reviewed and are negative. Physical Exam  Physical Exam  Vitals and nursing note reviewed. Constitutional:       Appearance: He is well-developed. HENT:      Head: Normocephalic and atraumatic. Right Ear: External ear normal.      Left Ear: External ear normal.      Nose: Nose normal.   Eyes:      Conjunctiva/sclera: Conjunctivae normal.      Pupils: Pupils are equal, round, and reactive to light. Cardiovascular:      Rate and Rhythm: Normal rate and regular rhythm. Heart sounds: Normal heart sounds. Pulmonary:      Effort: Pulmonary effort is normal. No respiratory distress. Breath sounds: Normal breath sounds. No wheezing.    Abdominal: General: Bowel sounds are normal. There is no distension. Palpations: Abdomen is soft. Tenderness: There is no abdominal tenderness. Genitourinary:     Rectum: Normal anal tone. Comments: Fecal impaction noted - disimpacted by me no complications  Musculoskeletal:         General: No deformity. Normal range of motion. Cervical back: Normal range of motion and neck supple. No spinous process tenderness. Skin:     General: Skin is warm and dry. Findings: No rash. Neurological:      General: No focal deficit present. Mental Status: He is alert. GCS: GCS eye subscore is 4. GCS verbal subscore is 5. GCS motor subscore is 6. Sensory: No sensory deficit.    Psychiatric:         Mood and Affect: Mood normal.         Vital Signs  ED Triage Vitals [08/05/23 1235]   Temperature Pulse Respirations Blood Pressure SpO2   98.5 °F (36.9 °C) 80 18 (!) 190/87 98 %      Temp src Heart Rate Source Patient Position - Orthostatic VS BP Location FiO2 (%)   -- Monitor Sitting Left arm --      Pain Score       No Pain           Vitals:    08/05/23 1235   BP: (!) 190/87   Pulse: 80   Patient Position - Orthostatic VS: Sitting         Visual Acuity      ED Medications  Medications - No data to display    Diagnostic Studies  Results Reviewed     Procedure Component Value Units Date/Time    Comprehensive metabolic panel [[id number]] Collected: 08/05/23 1238    Lab Status: Final result Specimen: Blood from Arm, Left Updated: 08/05/23 1311     Sodium 140 mmol/L      Potassium 3.9 mmol/L      Chloride 108 mmol/L      CO2 25 mmol/L      ANION GAP 7 mmol/L      BUN 20 mg/dL      Creatinine 0.93 mg/dL      Glucose 130 mg/dL      Calcium 9.5 mg/dL      AST 15 U/L      ALT 13 U/L      Alkaline Phosphatase 50 U/L      Total Protein 7.1 g/dL      Albumin 4.4 g/dL      Total Bilirubin 0.66 mg/dL      eGFR 82 ml/min/1.73sq m     Narrative:      [place] guidelines for Chronic Kidney Disease (CKD):   •  Stage 1 with normal or high GFR (GFR > 90 mL/min/1.73 square meters)  •  Stage 2 Mild CKD (GFR = 60-89 mL/min/1.73 square meters)  •  Stage 3A Moderate CKD (GFR = 45-59 mL/min/1.73 square meters)  •  Stage 3B Moderate CKD (GFR = 30-44 mL/min/1.73 square meters)  •  Stage 4 Severe CKD (GFR = 15-29 mL/min/1.73 square meters)  •  Stage 5 End Stage CKD (GFR <15 mL/min/1.73 square meters)  Note: GFR calculation is accurate only with a steady state creatinine    Lipase [[id number]]  (Normal) Collected: 08/05/23 1238    Lab Status: Final result Specimen: Blood from Arm, Left Updated: 08/05/23 1311     Lipase 30 u/L     CBC and differential [[id number]]  (Abnormal) Collected: 08/05/23 1238    Lab Status: Final result Specimen: Blood from Arm, Left Updated: 08/05/23 1250     WBC 9.03 Thousand/uL      RBC 5.01 Million/uL      Hemoglobin 14.5 g/dL      Hematocrit 43.2 %      MCV 86 fL      MCH 28.9 pg      MCHC 33.6 g/dL      RDW 14.3 %      MPV 9.0 fL      Platelets 289 Thousands/uL      nRBC 0 /100 WBCs      Neutrophils Relative 79 %      Immat GRANS % 0 %      Lymphocytes Relative 15 %      Monocytes Relative 6 %      Eosinophils Relative 0 %      Basophils Relative 0 %      Neutrophils Absolute 7.06 Thousands/µL      Immature Grans Absolute 0.04 Thousand/uL      Lymphocytes Absolute 1.34 Thousands/µL      Monocytes Absolute 0.52 Thousand/µL      Eosinophils Absolute 0.03 Thousand/µL      Basophils Absolute 0.04 Thousands/µL                  XR abdomen 1 view kub    (Results Pending)              Procedures  Procedures         ED Course                               SBIRT 20yo+    Flowsheet Row Most Recent Value   Initial Alcohol Screen: US AUDIT-C     1. How often do you have a drink containing alcohol? 0 Filed at: 08/05/2023 1236   2. How many drinks containing alcohol do you have on a typical day you are drinking? 0 Filed at: 08/05/2023 1236   3b. FEMALE Any Age, or MALE 65+:  How often do you have 4 or more drinks on one occassion? 0 Filed at: 08/05/2023 1236   Audit-C Score 0 Filed at: 08/05/2023 1236   [place]: How many times in the past year have you. .. Used an illegal drug or used a prescription medication for non-medical reasons? Never Filed at: 08/05/2023 1236                    Medical Decision Making  Differential includes fecal impaction, constipation, possibly related to travel, patient states he has been taking his thyroid meds so less likely hypothyroid. Less likely sbo, tumor without pain or vomiting. Amount and/or Complexity of Data Reviewed  Labs: ordered. Radiology: ordered. Disposition  Final diagnoses:   Constipation     Time reflects when diagnosis was documented in both MDM as applicable and the Disposition within this note     Time User Action Codes Description Comment    8/5/2023  3:31 PM Arpita Corley Add [K59.00] Constipation       ED Disposition     ED Disposition   Discharge    Condition   Stable    Date/Time   Sat Aug 5, 2023  4:23 PM    [facility] discharge to home/self care.                Follow-up Information    None         Discharge Medication List as of 8/5/2023  4:23 PM      CONTINUE these medications which have NOT CHANGED    Details   amiodarone 200 mg tablet Take 200 mg by mouth daily, Historical Med      Arginine 1000 MG TABS Take by mouth, Historical Med      aspirin 81 mg chewable tablet Chew 81 mg daily, Historical Med      atenolol (TENORMIN) 25 mg tablet TAKE 1 TABLET EVERY DAY AS DIRECTED, Normal      atorvastatin (LIPITOR) 20 mg tablet Take 20 mg by mouth daily, Starting Tue 1/23/2018, Historical Med      Biotin 300 MCG TABS Take by mouth, Historical Med      ciprofloxacin (CILOXAN) 0.3 % ophthalmic solution Apply 1 drop to eye, Starting Thu 10/12/2017, Historical Med      Coenzyme Q10 (COQ-10) 200 MG CAPS Take by mouth, Historical Med      erythromycin with ethanol (EMGEL) 2 % gel Apply topically daily, Starting Tue 8/28/2018, Normal folic acid (FOLVITE) 1 mg tablet Take 1 tablet by mouth daily, Historical Med      Glutamine 500 MG CAPS Take by mouth, Historical Med      levothyroxine 100 mcg tablet Take 100 mcg by mouth daily, Starting Sat 10/20/2018, Historical Med      Multiple Vitamins-Minerals (MULTI COMPLETE) CAPS Take by mouth, Historical Med      Omega-3 1000 MG CAPS Take by mouth, Historical Med      potassium chloride (MICRO-K) 10 MEQ CR capsule Take 10 mEq by mouth 2 (two) times a day, Historical Med      tadalafil (CIALIS) 5 MG tablet Take 1 tablet (5 mg total) by mouth as needed for erectile dysfunction, Starting Tue 8/28/2018, Normal             No discharge procedures on file.     PDMP Review     None          ED Provider  Electronically Signed by           Avis Wong DO  08/05/23 9789